# Patient Record
Sex: MALE | Race: OTHER | HISPANIC OR LATINO | URBAN - METROPOLITAN AREA
[De-identification: names, ages, dates, MRNs, and addresses within clinical notes are randomized per-mention and may not be internally consistent; named-entity substitution may affect disease eponyms.]

---

## 2022-12-01 ENCOUNTER — EMERGENCY (EMERGENCY)
Facility: HOSPITAL | Age: 57
LOS: 1 days | Discharge: ANOTHER HOSPITAL | End: 2022-12-01
Attending: STUDENT IN AN ORGANIZED HEALTH CARE EDUCATION/TRAINING PROGRAM
Payer: MEDICAID

## 2022-12-01 VITALS
WEIGHT: 279.99 LBS | DIASTOLIC BLOOD PRESSURE: 120 MMHG | SYSTOLIC BLOOD PRESSURE: 240 MMHG | HEIGHT: 74 IN | OXYGEN SATURATION: 99 % | TEMPERATURE: 98 F | HEART RATE: 70 BPM | RESPIRATION RATE: 20 BRPM

## 2022-12-01 VITALS
HEART RATE: 72 BPM | RESPIRATION RATE: 18 BRPM | OXYGEN SATURATION: 96 % | SYSTOLIC BLOOD PRESSURE: 212 MMHG | DIASTOLIC BLOOD PRESSURE: 76 MMHG | TEMPERATURE: 97 F

## 2022-12-01 LAB
ALBUMIN SERPL ELPH-MCNC: 3.6 G/DL — SIGNIFICANT CHANGE UP (ref 3.5–5)
ALP SERPL-CCNC: 71 U/L — SIGNIFICANT CHANGE UP (ref 40–120)
ALT FLD-CCNC: 20 U/L DA — SIGNIFICANT CHANGE UP (ref 10–60)
ANION GAP SERPL CALC-SCNC: 7 MMOL/L — SIGNIFICANT CHANGE UP (ref 5–17)
APTT BLD: 31.6 SEC — SIGNIFICANT CHANGE UP (ref 27.5–35.5)
AST SERPL-CCNC: 13 U/L — SIGNIFICANT CHANGE UP (ref 10–40)
BASOPHILS # BLD AUTO: 0.06 K/UL — SIGNIFICANT CHANGE UP (ref 0–0.2)
BASOPHILS NFR BLD AUTO: 0.8 % — SIGNIFICANT CHANGE UP (ref 0–2)
BILIRUB SERPL-MCNC: 0.2 MG/DL — SIGNIFICANT CHANGE UP (ref 0.2–1.2)
BUN SERPL-MCNC: 18 MG/DL — SIGNIFICANT CHANGE UP (ref 7–18)
CALCIUM SERPL-MCNC: 8.9 MG/DL — SIGNIFICANT CHANGE UP (ref 8.4–10.5)
CHLORIDE SERPL-SCNC: 102 MMOL/L — SIGNIFICANT CHANGE UP (ref 96–108)
CO2 SERPL-SCNC: 31 MMOL/L — SIGNIFICANT CHANGE UP (ref 22–31)
CREAT SERPL-MCNC: 1.13 MG/DL — SIGNIFICANT CHANGE UP (ref 0.5–1.3)
EGFR: 76 ML/MIN/1.73M2 — SIGNIFICANT CHANGE UP
EOSINOPHIL # BLD AUTO: 0.19 K/UL — SIGNIFICANT CHANGE UP (ref 0–0.5)
EOSINOPHIL NFR BLD AUTO: 2.7 % — SIGNIFICANT CHANGE UP (ref 0–6)
GLUCOSE SERPL-MCNC: 126 MG/DL — HIGH (ref 70–99)
HCT VFR BLD CALC: 43 % — SIGNIFICANT CHANGE UP (ref 39–50)
HGB BLD-MCNC: 13.1 G/DL — SIGNIFICANT CHANGE UP (ref 13–17)
IMM GRANULOCYTES NFR BLD AUTO: 0.3 % — SIGNIFICANT CHANGE UP (ref 0–0.9)
INR BLD: 1.06 RATIO — SIGNIFICANT CHANGE UP (ref 0.88–1.16)
LYMPHOCYTES # BLD AUTO: 2.12 K/UL — SIGNIFICANT CHANGE UP (ref 1–3.3)
LYMPHOCYTES # BLD AUTO: 29.9 % — SIGNIFICANT CHANGE UP (ref 13–44)
MCHC RBC-ENTMCNC: 25 PG — LOW (ref 27–34)
MCHC RBC-ENTMCNC: 30.5 GM/DL — LOW (ref 32–36)
MCV RBC AUTO: 82.1 FL — SIGNIFICANT CHANGE UP (ref 80–100)
MONOCYTES # BLD AUTO: 0.84 K/UL — SIGNIFICANT CHANGE UP (ref 0–0.9)
MONOCYTES NFR BLD AUTO: 11.9 % — SIGNIFICANT CHANGE UP (ref 2–14)
NEUTROPHILS # BLD AUTO: 3.85 K/UL — SIGNIFICANT CHANGE UP (ref 1.8–7.4)
NEUTROPHILS NFR BLD AUTO: 54.4 % — SIGNIFICANT CHANGE UP (ref 43–77)
NRBC # BLD: 0 /100 WBCS — SIGNIFICANT CHANGE UP (ref 0–0)
NT-PROBNP SERPL-SCNC: 41 PG/ML — SIGNIFICANT CHANGE UP (ref 0–125)
PLATELET # BLD AUTO: 200 K/UL — SIGNIFICANT CHANGE UP (ref 150–400)
POTASSIUM SERPL-MCNC: 3.5 MMOL/L — SIGNIFICANT CHANGE UP (ref 3.5–5.3)
POTASSIUM SERPL-SCNC: 3.5 MMOL/L — SIGNIFICANT CHANGE UP (ref 3.5–5.3)
PROT SERPL-MCNC: 7.4 G/DL — SIGNIFICANT CHANGE UP (ref 6–8.3)
PROTHROM AB SERPL-ACNC: 12.6 SEC — SIGNIFICANT CHANGE UP (ref 10.5–13.4)
RBC # BLD: 5.24 M/UL — SIGNIFICANT CHANGE UP (ref 4.2–5.8)
RBC # FLD: 15.3 % — HIGH (ref 10.3–14.5)
SARS-COV-2 RNA SPEC QL NAA+PROBE: SIGNIFICANT CHANGE UP
SODIUM SERPL-SCNC: 140 MMOL/L — SIGNIFICANT CHANGE UP (ref 135–145)
TROPONIN I, HIGH SENSITIVITY RESULT: 11.5 NG/L — SIGNIFICANT CHANGE UP
WBC # BLD: 7.08 K/UL — SIGNIFICANT CHANGE UP (ref 3.8–10.5)
WBC # FLD AUTO: 7.08 K/UL — SIGNIFICANT CHANGE UP (ref 3.8–10.5)

## 2022-12-01 PROCEDURE — 83880 ASSAY OF NATRIURETIC PEPTIDE: CPT

## 2022-12-01 PROCEDURE — 74174 CTA ABD&PLVS W/CONTRAST: CPT | Mod: MA

## 2022-12-01 PROCEDURE — 93010 ELECTROCARDIOGRAM REPORT: CPT

## 2022-12-01 PROCEDURE — 96375 TX/PRO/DX INJ NEW DRUG ADDON: CPT | Mod: XU

## 2022-12-01 PROCEDURE — 71275 CT ANGIOGRAPHY CHEST: CPT | Mod: 26,MA

## 2022-12-01 PROCEDURE — 87635 SARS-COV-2 COVID-19 AMP PRB: CPT

## 2022-12-01 PROCEDURE — 84484 ASSAY OF TROPONIN QUANT: CPT

## 2022-12-01 PROCEDURE — 85730 THROMBOPLASTIN TIME PARTIAL: CPT

## 2022-12-01 PROCEDURE — 85025 COMPLETE CBC W/AUTO DIFF WBC: CPT

## 2022-12-01 PROCEDURE — 80053 COMPREHEN METABOLIC PANEL: CPT

## 2022-12-01 PROCEDURE — 93005 ELECTROCARDIOGRAM TRACING: CPT

## 2022-12-01 PROCEDURE — 96376 TX/PRO/DX INJ SAME DRUG ADON: CPT | Mod: XU

## 2022-12-01 PROCEDURE — 74174 CTA ABD&PLVS W/CONTRAST: CPT | Mod: 26,MA

## 2022-12-01 PROCEDURE — 85610 PROTHROMBIN TIME: CPT

## 2022-12-01 PROCEDURE — 99291 CRITICAL CARE FIRST HOUR: CPT | Mod: 25

## 2022-12-01 PROCEDURE — 71275 CT ANGIOGRAPHY CHEST: CPT | Mod: MA

## 2022-12-01 PROCEDURE — 96374 THER/PROPH/DIAG INJ IV PUSH: CPT | Mod: XU

## 2022-12-01 PROCEDURE — 99291 CRITICAL CARE FIRST HOUR: CPT

## 2022-12-01 PROCEDURE — 36415 COLL VENOUS BLD VENIPUNCTURE: CPT

## 2022-12-01 RX ORDER — NICARDIPINE HYDROCHLORIDE 30 MG/1
5 CAPSULE, EXTENDED RELEASE ORAL
Qty: 40 | Refills: 0 | Status: DISCONTINUED | OUTPATIENT
Start: 2022-12-01 | End: 2022-12-05

## 2022-12-01 RX ORDER — LABETALOL HCL 100 MG
20 TABLET ORAL ONCE
Refills: 0 | Status: COMPLETED | OUTPATIENT
Start: 2022-12-01 | End: 2022-12-01

## 2022-12-01 RX ORDER — FENTANYL CITRATE 50 UG/ML
100 INJECTION INTRAVENOUS ONCE
Refills: 0 | Status: DISCONTINUED | OUTPATIENT
Start: 2022-12-01 | End: 2022-12-01

## 2022-12-01 RX ORDER — MORPHINE SULFATE 50 MG/1
4 CAPSULE, EXTENDED RELEASE ORAL ONCE
Refills: 0 | Status: DISCONTINUED | OUTPATIENT
Start: 2022-12-01 | End: 2022-12-01

## 2022-12-01 RX ORDER — ESMOLOL HCL 100MG/10ML
50 VIAL (ML) INTRAVENOUS
Qty: 2500 | Refills: 0 | Status: DISCONTINUED | OUTPATIENT
Start: 2022-12-01 | End: 2022-12-05

## 2022-12-01 RX ADMIN — NICARDIPINE HYDROCHLORIDE 25 MG/HR: 30 CAPSULE, EXTENDED RELEASE ORAL at 23:34

## 2022-12-01 RX ADMIN — MORPHINE SULFATE 4 MILLIGRAM(S): 50 CAPSULE, EXTENDED RELEASE ORAL at 22:14

## 2022-12-01 RX ADMIN — MORPHINE SULFATE 4 MILLIGRAM(S): 50 CAPSULE, EXTENDED RELEASE ORAL at 20:18

## 2022-12-01 RX ADMIN — Medication 20 MILLIGRAM(S): at 23:30

## 2022-12-01 RX ADMIN — Medication 38.1 MICROGRAM(S)/KG/MIN: at 22:33

## 2022-12-01 RX ADMIN — FENTANYL CITRATE 100 MICROGRAM(S): 50 INJECTION INTRAVENOUS at 22:33

## 2022-12-01 NOTE — ED PROVIDER NOTE - CLINICAL SUMMARY MEDICAL DECISION MAKING FREE TEXT BOX
Kristine: 57-year-old male with past medical history hypertension, "arrhythmia" presents with bilateral chest pain radiating to the back over the past 2 days.  Patient reports severe bilateral back pain with radiation to the chest, reports occasional radiation to left lower extremity over the past 2 days.  Patient reports history of hypertension, states noncompliant with medications.  Denies any fevers, abdominal pain, vomiting, diarrhea, bloody stools, black tarry stools, or rash.  No recent injury or trauma.  History, exam, and vital signs concerning for aortic dissection. Neuro exam nonfocal, distal pulses intact. Called CT to expedite CTA, signed off waiving labs. Plan includes labs, imaging, supportive treatment with dispo pending workup.

## 2022-12-01 NOTE — ED PROVIDER NOTE - PHYSICAL EXAMINATION
CONSTITUTIONAL: non-toxic, uncomfortable appearing  SKIN: no rash, no petechiae.  EYES: PERRL, EOMI, pink conjunctiva, anicteric  ENT: tongue and uvular midline, no exudates, moist mucous membranes  NECK: Supple; no meningismus, no JVD  CARD: RRR, no murmurs, equal radial pulses bilaterally 2+  RESP: CTAB, no respiratory distress  ABD: Soft, non-tender, non-distended, no peritoneal signs, no CVA tenderness  EXT: Normal ROM x4. No edema. DP 2+  NEURO: Alert, oriented. Neuro exam nonfocal.  PSYCH: Cooperative, appropriate.

## 2022-12-01 NOTE — ED ADULT NURSE NOTE - OBJECTIVE STATEMENT
Pt presents to the ER from home for SOB with back Pt presents to the ER from home for SOB/CP with back pain and elevated BP. Pt diaphoretic with elevated BP upon arrival. Pt does not take home rx for HTN. -fever/chills/n/v    GCS 15, airway intact, pt ambulatory

## 2022-12-01 NOTE — ED PROVIDER NOTE - CRITICAL CARE ATTENDING CONTRIBUTION TO CARE
I have personally provided the amount of critical care time documented below, excluding time spent on separate procedures.     The patient's condition is critical. Management options were put in place to ensure further deterioration does not occur. In addition to the usual care provided, I have spent additional time with this patient through but not limited to the following: additional documentation  reviewing test results,  discussing with consultants,  discussing with patient / patient's family prognosis and course of care,  reassessment of patient's status and response to interventions.

## 2022-12-01 NOTE — ED PROVIDER NOTE - PROGRESS NOTE DETAILS
Kristine: received call from radiology, CTA showing type B dissection. Additional pain medication and antihypertensive medications ordered. Spoke to [facility] via transfer center to initiate transfer.

## 2022-12-01 NOTE — ED PROVIDER NOTE - OBJECTIVE STATEMENT
57-year-old male with past medical history hypertension, "arrhythmia" presents with bilateral chest pain radiating to the back over the past 2 days.  Patient reports severe bilateral back pain with radiation to the chest, reports occasional radiation to left lower extremity over the past 2 days.  Patient reports history of hypertension, states noncompliant with medications.  Denies any fevers, abdominal pain, vomiting, diarrhea, bloody stools, black tarry stools, or rash.  No recent injury or trauma.  Denies any additional complaints.

## 2022-12-02 ENCOUNTER — INPATIENT (INPATIENT)
Facility: HOSPITAL | Age: 57
LOS: 4 days | Discharge: HOME | DRG: 300 | End: 2022-12-07
Attending: THORACIC SURGERY (CARDIOTHORACIC VASCULAR SURGERY) | Admitting: THORACIC SURGERY (CARDIOTHORACIC VASCULAR SURGERY)
Payer: MEDICAID

## 2022-12-02 VITALS
DIASTOLIC BLOOD PRESSURE: 77 MMHG | OXYGEN SATURATION: 100 % | TEMPERATURE: 97 F | SYSTOLIC BLOOD PRESSURE: 140 MMHG | HEART RATE: 81 BPM | RESPIRATION RATE: 21 BRPM | WEIGHT: 293.88 LBS

## 2022-12-02 DIAGNOSIS — I71.00 DISSECTION OF UNSPECIFIED SITE OF AORTA: ICD-10-CM

## 2022-12-02 DIAGNOSIS — I71.03 DISSECTION OF THORACOABDOMINAL AORTA: ICD-10-CM

## 2022-12-02 LAB
ALBUMIN SERPL ELPH-MCNC: 4.2 G/DL — SIGNIFICANT CHANGE UP (ref 3.3–5)
ALP SERPL-CCNC: 68 U/L — SIGNIFICANT CHANGE UP (ref 40–120)
ALT FLD-CCNC: 10 U/L — SIGNIFICANT CHANGE UP (ref 10–45)
AMYLASE P1 CFR SERPL: 41 U/L — SIGNIFICANT CHANGE UP (ref 25–125)
ANION GAP SERPL CALC-SCNC: 12 MMOL/L — SIGNIFICANT CHANGE UP (ref 5–17)
APTT BLD: 30.1 SEC — SIGNIFICANT CHANGE UP (ref 27.5–35.5)
AST SERPL-CCNC: 11 U/L — SIGNIFICANT CHANGE UP (ref 10–40)
BASOPHILS # BLD AUTO: 0.04 K/UL — SIGNIFICANT CHANGE UP (ref 0–0.2)
BASOPHILS NFR BLD AUTO: 0.4 % — SIGNIFICANT CHANGE UP (ref 0–2)
BILIRUB SERPL-MCNC: 0.3 MG/DL — SIGNIFICANT CHANGE UP (ref 0.2–1.2)
BLD GP AB SCN SERPL QL: NEGATIVE — SIGNIFICANT CHANGE UP
BUN SERPL-MCNC: 16 MG/DL — SIGNIFICANT CHANGE UP (ref 7–23)
CALCIUM SERPL-MCNC: 8.7 MG/DL — SIGNIFICANT CHANGE UP (ref 8.4–10.5)
CHLORIDE SERPL-SCNC: 100 MMOL/L — SIGNIFICANT CHANGE UP (ref 96–108)
CK MB CFR SERPL CALC: 1.4 NG/ML — SIGNIFICANT CHANGE UP (ref 0–6.7)
CK SERPL-CCNC: 78 U/L — SIGNIFICANT CHANGE UP (ref 30–200)
CO2 SERPL-SCNC: 25 MMOL/L — SIGNIFICANT CHANGE UP (ref 22–31)
CREAT SERPL-MCNC: 0.85 MG/DL — SIGNIFICANT CHANGE UP (ref 0.5–1.3)
EGFR: 102 ML/MIN/1.73M2 — SIGNIFICANT CHANGE UP
EOSINOPHIL # BLD AUTO: 0 K/UL — SIGNIFICANT CHANGE UP (ref 0–0.5)
EOSINOPHIL NFR BLD AUTO: 0 % — SIGNIFICANT CHANGE UP (ref 0–6)
FIBRINOGEN PPP-MCNC: 311 MG/DL — SIGNIFICANT CHANGE UP (ref 200–445)
GAS PNL BLDA: SIGNIFICANT CHANGE UP
GLUCOSE SERPL-MCNC: 178 MG/DL — HIGH (ref 70–99)
HCT VFR BLD CALC: 38.6 % — LOW (ref 39–50)
HGB BLD-MCNC: 12 G/DL — LOW (ref 13–17)
IMM GRANULOCYTES NFR BLD AUTO: 0.4 % — SIGNIFICANT CHANGE UP (ref 0–0.9)
INR BLD: 1.09 RATIO — SIGNIFICANT CHANGE UP (ref 0.88–1.16)
LIDOCAIN IGE QN: 18 U/L — SIGNIFICANT CHANGE UP (ref 7–60)
LYMPHOCYTES # BLD AUTO: 0.52 K/UL — LOW (ref 1–3.3)
LYMPHOCYTES # BLD AUTO: 5.7 % — LOW (ref 13–44)
MAGNESIUM SERPL-MCNC: 1.9 MG/DL — SIGNIFICANT CHANGE UP (ref 1.6–2.6)
MCHC RBC-ENTMCNC: 25 PG — LOW (ref 27–34)
MCHC RBC-ENTMCNC: 31.1 GM/DL — LOW (ref 32–36)
MCV RBC AUTO: 80.4 FL — SIGNIFICANT CHANGE UP (ref 80–100)
MONOCYTES # BLD AUTO: 0.37 K/UL — SIGNIFICANT CHANGE UP (ref 0–0.9)
MONOCYTES NFR BLD AUTO: 4.1 % — SIGNIFICANT CHANGE UP (ref 2–14)
NEUTROPHILS # BLD AUTO: 8.13 K/UL — HIGH (ref 1.8–7.4)
NEUTROPHILS NFR BLD AUTO: 89.4 % — HIGH (ref 43–77)
NRBC # BLD: 0 /100 WBCS — SIGNIFICANT CHANGE UP (ref 0–0)
NT-PROBNP SERPL-SCNC: 39 PG/ML — SIGNIFICANT CHANGE UP (ref 0–300)
PHOSPHATE SERPL-MCNC: 3.6 MG/DL — SIGNIFICANT CHANGE UP (ref 2.5–4.5)
PLATELET # BLD AUTO: 182 K/UL — SIGNIFICANT CHANGE UP (ref 150–400)
POTASSIUM SERPL-MCNC: 3.9 MMOL/L — SIGNIFICANT CHANGE UP (ref 3.5–5.3)
POTASSIUM SERPL-SCNC: 3.9 MMOL/L — SIGNIFICANT CHANGE UP (ref 3.5–5.3)
PROT SERPL-MCNC: 7.1 G/DL — SIGNIFICANT CHANGE UP (ref 6–8.3)
PROTHROM AB SERPL-ACNC: 12.6 SEC — SIGNIFICANT CHANGE UP (ref 10.5–13.4)
RBC # BLD: 4.8 M/UL — SIGNIFICANT CHANGE UP (ref 4.2–5.8)
RBC # FLD: 15.2 % — HIGH (ref 10.3–14.5)
RH IG SCN BLD-IMP: POSITIVE — SIGNIFICANT CHANGE UP
RH IG SCN BLD-IMP: POSITIVE — SIGNIFICANT CHANGE UP
SODIUM SERPL-SCNC: 137 MMOL/L — SIGNIFICANT CHANGE UP (ref 135–145)
TROPONIN T, HIGH SENSITIVITY RESULT: <6 NG/L — SIGNIFICANT CHANGE UP (ref 0–51)
WBC # BLD: 9.1 K/UL — SIGNIFICANT CHANGE UP (ref 3.8–10.5)
WBC # FLD AUTO: 9.1 K/UL — SIGNIFICANT CHANGE UP (ref 3.8–10.5)

## 2022-12-02 PROCEDURE — 93010 ELECTROCARDIOGRAM REPORT: CPT

## 2022-12-02 PROCEDURE — 99291 CRITICAL CARE FIRST HOUR: CPT | Mod: 25

## 2022-12-02 PROCEDURE — 99292 CRITICAL CARE ADDL 30 MIN: CPT | Mod: 25

## 2022-12-02 PROCEDURE — 36620 INSERTION CATHETER ARTERY: CPT

## 2022-12-02 PROCEDURE — 93306 TTE W/DOPPLER COMPLETE: CPT | Mod: 26

## 2022-12-02 PROCEDURE — 99221 1ST HOSP IP/OBS SF/LOW 40: CPT | Mod: 25

## 2022-12-02 RX ORDER — CHLORHEXIDINE GLUCONATE 213 G/1000ML
1 SOLUTION TOPICAL
Refills: 0 | Status: DISCONTINUED | OUTPATIENT
Start: 2022-12-02 | End: 2022-12-04

## 2022-12-02 RX ORDER — LABETALOL HCL 100 MG
2 TABLET ORAL
Qty: 200 | Refills: 0 | Status: DISCONTINUED | OUTPATIENT
Start: 2022-12-02 | End: 2022-12-02

## 2022-12-02 RX ORDER — NICARDIPINE HYDROCHLORIDE 30 MG/1
5 CAPSULE, EXTENDED RELEASE ORAL
Qty: 40 | Refills: 0 | Status: DISCONTINUED | OUTPATIENT
Start: 2022-12-02 | End: 2022-12-04

## 2022-12-02 RX ORDER — LABETALOL HCL 100 MG
200 TABLET ORAL EVERY 8 HOURS
Refills: 0 | Status: DISCONTINUED | OUTPATIENT
Start: 2022-12-02 | End: 2022-12-02

## 2022-12-02 RX ORDER — PANTOPRAZOLE SODIUM 20 MG/1
40 TABLET, DELAYED RELEASE ORAL
Refills: 0 | Status: DISCONTINUED | OUTPATIENT
Start: 2022-12-02 | End: 2022-12-04

## 2022-12-02 RX ORDER — ENOXAPARIN SODIUM 100 MG/ML
40 INJECTION SUBCUTANEOUS EVERY 24 HOURS
Refills: 0 | Status: DISCONTINUED | OUTPATIENT
Start: 2022-12-02 | End: 2022-12-07

## 2022-12-02 RX ORDER — LABETALOL HCL 100 MG
400 TABLET ORAL EVERY 8 HOURS
Refills: 0 | Status: DISCONTINUED | OUTPATIENT
Start: 2022-12-02 | End: 2022-12-03

## 2022-12-02 RX ORDER — HYDROMORPHONE HYDROCHLORIDE 2 MG/ML
1 INJECTION INTRAMUSCULAR; INTRAVENOUS; SUBCUTANEOUS ONCE
Refills: 0 | Status: DISCONTINUED | OUTPATIENT
Start: 2022-12-02 | End: 2022-12-02

## 2022-12-02 RX ORDER — ACETAMINOPHEN 500 MG
650 TABLET ORAL EVERY 6 HOURS
Refills: 0 | Status: DISCONTINUED | OUTPATIENT
Start: 2022-12-02 | End: 2022-12-07

## 2022-12-02 RX ORDER — LABETALOL HCL 100 MG
2 TABLET ORAL
Qty: 1000 | Refills: 0 | Status: DISCONTINUED | OUTPATIENT
Start: 2022-12-02 | End: 2022-12-04

## 2022-12-02 RX ORDER — HYDROMORPHONE HYDROCHLORIDE 2 MG/ML
0.5 INJECTION INTRAMUSCULAR; INTRAVENOUS; SUBCUTANEOUS EVERY 6 HOURS
Refills: 0 | Status: DISCONTINUED | OUTPATIENT
Start: 2022-12-02 | End: 2022-12-04

## 2022-12-02 RX ADMIN — Medication 650 MILLIGRAM(S): at 18:44

## 2022-12-02 RX ADMIN — Medication 30 MG/MIN: at 19:42

## 2022-12-02 RX ADMIN — HYDROMORPHONE HYDROCHLORIDE 0.5 MILLIGRAM(S): 2 INJECTION INTRAMUSCULAR; INTRAVENOUS; SUBCUTANEOUS at 09:30

## 2022-12-02 RX ADMIN — NICARDIPINE HYDROCHLORIDE 25 MG/HR: 30 CAPSULE, EXTENDED RELEASE ORAL at 06:52

## 2022-12-02 RX ADMIN — Medication 30 MG/MIN: at 18:56

## 2022-12-02 RX ADMIN — Medication 650 MILLIGRAM(S): at 19:00

## 2022-12-02 RX ADMIN — NICARDIPINE HYDROCHLORIDE 25 MG/HR: 30 CAPSULE, EXTENDED RELEASE ORAL at 18:57

## 2022-12-02 RX ADMIN — Medication 120 MG/MIN: at 02:27

## 2022-12-02 RX ADMIN — PANTOPRAZOLE SODIUM 40 MILLIGRAM(S): 20 TABLET, DELAYED RELEASE ORAL at 06:52

## 2022-12-02 RX ADMIN — NICARDIPINE HYDROCHLORIDE 25 MG/HR: 30 CAPSULE, EXTENDED RELEASE ORAL at 00:25

## 2022-12-02 RX ADMIN — PANTOPRAZOLE SODIUM 40 MILLIGRAM(S): 20 TABLET, DELAYED RELEASE ORAL at 18:18

## 2022-12-02 RX ADMIN — CHLORHEXIDINE GLUCONATE 1 APPLICATION(S): 213 SOLUTION TOPICAL at 06:46

## 2022-12-02 RX ADMIN — HYDROMORPHONE HYDROCHLORIDE 0.5 MILLIGRAM(S): 2 INJECTION INTRAMUSCULAR; INTRAVENOUS; SUBCUTANEOUS at 09:00

## 2022-12-02 RX ADMIN — Medication 400 MILLIGRAM(S): at 19:00

## 2022-12-02 RX ADMIN — Medication 120 MG/MIN: at 00:25

## 2022-12-02 RX ADMIN — ENOXAPARIN SODIUM 40 MILLIGRAM(S): 100 INJECTION SUBCUTANEOUS at 06:52

## 2022-12-02 RX ADMIN — Medication 120 MG/MIN: at 04:14

## 2022-12-02 RX ADMIN — NICARDIPINE HYDROCHLORIDE 25 MG/HR: 30 CAPSULE, EXTENDED RELEASE ORAL at 19:42

## 2022-12-02 RX ADMIN — Medication 200 MILLIGRAM(S): at 11:26

## 2022-12-02 RX ADMIN — Medication 30 MG/MIN: at 05:56

## 2022-12-02 RX ADMIN — NICARDIPINE HYDROCHLORIDE 25 MG/HR: 30 CAPSULE, EXTENDED RELEASE ORAL at 02:27

## 2022-12-02 RX ADMIN — HYDROMORPHONE HYDROCHLORIDE 1 MILLIGRAM(S): 2 INJECTION INTRAMUSCULAR; INTRAVENOUS; SUBCUTANEOUS at 00:26

## 2022-12-02 RX ADMIN — HYDROMORPHONE HYDROCHLORIDE 1 MILLIGRAM(S): 2 INJECTION INTRAMUSCULAR; INTRAVENOUS; SUBCUTANEOUS at 00:41

## 2022-12-02 NOTE — H&P ADULT - PROBLEM SELECTOR PLAN 1
Acute TYPE b dissection   aggressive BP control with cardene , labetaolol   Pain management   Vascular consult -/+ need for TEVAR stent    GI / DVT prophylaxis.   keep K>4, mag >2.0   NPO   serial Physical exam / to r/o end organ malperfusion .

## 2022-12-02 NOTE — H&P ADULT - HISTORY OF PRESENT ILLNESS
57-year-old male with past medical history hypertension, "arrhythmia" presents with bilateral chest pain radiating to the back over the past 2 days.  Patient reports severe bilateral back pain with radiation to the chest, reports occasional radiation to left lower extremity over the past 2 days.  Patient reports history of hypertension, states noncompliant with medications.  Denies any fevers, abdominal pain, vomiting, diarrhea, bloody stools, black tarry stools, or rash.  No recent injury or trauma.  Denies any additional complaints.    CT scan completed and found to have a Type B aortic dissection . Pt with hypertension emergency.   started on Cardene gtt and esmolol gtt.   after ct scan , pt was transferred to [place] for further medical and surgical management.

## 2022-12-02 NOTE — PATIENT PROFILE ADULT - FALL HARM RISK - HARM RISK INTERVENTIONS
Assistance OOB with selected safe patient handling equipment/Communicate Risk of Fall with Harm to all staff/Reinforce activity limits and safety measures with patient and family/Review medications for side effects contributing to fall risk/Sit up slowly, dangle for a short time, stand at bedside before walking/Tailored Fall Risk Interventions/Toileting schedule using arm’s reach rule for commode and bathroom/Visual Cue: Yellow wristband and red socks/Bed in lowest position, wheels locked, appropriate side rails in place/Call bell, personal items and telephone in reach/Instruct patient to call for assistance before getting out of bed or chair/Non-slip footwear when patient is out of bed/[place] to call system/Physically safe environment - no spills, clutter or unnecessary equipment/Purposeful Proactive Rounding/Room/bathroom lighting operational, light cord in reach

## 2022-12-02 NOTE — PROGRESS NOTE ADULT - SUBJECTIVE AND OBJECTIVE BOX
HPI:   57-year-old male with past medical history hypertension, "arrhythmia" presents with bilateral chest pain radiating to the back over the past 2 days.  Patient reports severe bilateral back pain with radiation to the chest, reports occasional radiation to left lower extremity over the past 2 days.  Patient reports history of hypertension, states noncompliant with medications.  Denies any fevers, abdominal pain, vomiting, diarrhea, bloody stools, black tarry stools, or rash.  No recent injury or trauma.  Denies any additional complaints.    CT scan completed and found to have a Type B aortic dissection . Pt with hypertension emergency.   started on Cardene gtt and esmolol gtt.   after ct scan , pt was transferred to [place] for further medical and surgical management.  (02 Dec 2022 00:28)      Patient seen and examined at the bedside.    Remained critically ill on continuous ICU monitoring.    OBJECTIVE:  Vital Signs Last 24 Hrs  T(C): 36.4 (02 Dec 2022 04:00), Max: 36.9 (01 Dec 2022 22:59)  T(F): 97.6 (02 Dec 2022 04:00), Max: 98.5 (01 Dec 2022 22:59)  HR: 76 (02 Dec 2022 05:15) (70 - 82)  BP: 140/77 (02 Dec 2022 00:15) (140/77 - 240/120)  BP(mean): 82 (02 Dec 2022 00:15) (82 - 82)  RR: 24 (02 Dec 2022 05:15) (13 - 30)  SpO2: 99% (02 Dec 2022 05:15) (94% - 100%)    Parameters below as of 02 Dec 2022 04:00  Patient On (Oxygen Delivery Method): nasal cannula  O2 Flow (L/min): 2      Physical Exam:   General: NAD   Neurology: nonfocal   Eyes: bilateral pupils equal and reactive   ENT/Neck: Neck supple, trachea midline, No JVD   Respiratory: Clear bilaterally   CV: S1S2, no murmurs        [x] Sternal dressing,        [x] Sinus rhythm,   Abdominal: Soft, NT, ND +BS  Extremities: 1-2+ pedal edema noted, + peripheral pulses   Skin: No Rashes, Hematoma, Ecchymosis                           Assessment:  57-year-old male with past medical history hypertension, "arrhythmia" presents with bilateral chest pain radiating to the back over the past 2 days.  Patient reports severe bilateral back pain with radiation to the chest, reports occasional radiation to left lower extremity over the past 2 days.  Patient reports history of hypertension, states noncompliant with medications.  Denies any fevers, abdominal pain, vomiting, diarrhea, bloody stools, black tarry stools, or rash.  No recent injury or trauma.  Denies any additional complaints. CT scan completed and found to have a Type B aortic dissection . Pt with hypertension emergency.  started on Cardene gtt and esmolol gtt. after ct scan , pt was transferred to [place] for further medical and surgical management.       Transfer from [facility] Type B dissection  Hemodynamic instability  Lactic acidosis       Plan:   ***Neuro***  Continue close monitoring of neurological status      ***Cardiovascular***  Transfer from [facility] Type B dissection. Lactate elevated to 2.6, continue trending to monitor for fluid resuscitation as indicated. Invasive hemodynamic monitoring with a central venous catheter & an A-line were required for the continuous central venous and MAP/BP monitoring to ensure adequate cardiovascular support. IV Labetalol and Cardene for blood pressure management.     ***Pulmonary***  Respiratory status required supplemental oxygen with nasal cannula 2 L, close monitoring of breathing pattern and respiratory rate & the following of continuous pulse oximetry for support & to prevent decompensation. Increased concern for atelectasis due to obesity related restrictive lung disease.              ***Heme***  Anemia due to acute blood loss, no current plan for transfusion. Continue to monitor hemoglobin and hematocrit levels. Lovenox continued for venous thromboembolism prophylaxis in addition to sequential compression devices    ***GI***  Patient is currently NPO. Protonix for stress ulcer prophylaxis.     ***Renal***  Optimize renal perfusion with adequate volume resuscitation and continued monitoring of urine output, fluid balance, electrolytes, and BUN/Creatinine.     ***ID***  Afebrile, white blood count within normal limits.   Continue trending white blood count and monitoring fever curve.       ***Endocrine***  No acute issues, monitor glucose for need to initiate sliding scale.          Patient requires continuous monitoring with bedside rhythm monitoring, pulse oximetry monitoring, and continuous central venous and arterial pressure monitoring; and intermittent blood gas analysis. Care plan discussed with the ICU care team.   Patient remained critical, at risk for life threatening decompensation.    I have spent 30 minutes providing critical care management to this patient.    By signing my name below, I, Maria D'Amico, attest that this documentation has been prepared under the direction and in the presence of Sally Oden MD   Electronically signed: Maria D'Amico, Scribe, 12-02-22 @ 06:22    I, Sally Oden, personally performed the services described in this documentation. all medical record entries made by the scribe were at my direction and in my presence. I have reviewed the chart and agree that the record reflects my personal performance and is accurate and complete  Electronically signed: Sally Oden MD  HPI:   57-year-old male with past medical history hypertension, "arrhythmia" presents with bilateral chest pain radiating to the back over the past 2 days.  Patient reports severe bilateral back pain with radiation to the chest, reports occasional radiation to left lower extremity over the past 2 days.  Patient reports history of hypertension, states noncompliant with medications.  Denies any fevers, abdominal pain, vomiting, diarrhea, bloody stools, black tarry stools, or rash.  No recent injury or trauma.  Denies any additional complaints.    CT scan completed and found to have a Type B aortic dissection . Pt with hypertension emergency.   started on Cardene gtt and esmolol gtt.   after ct scan , pt was transferred to [place] for further medical and surgical management.  (02 Dec 2022 00:28)      Patient seen and examined at the bedside.    Remained critically ill on continuous ICU monitoring.    OBJECTIVE:  Vital Signs Last 24 Hrs  T(C): 36.4 (02 Dec 2022 04:00), Max: 36.9 (01 Dec 2022 22:59)  T(F): 97.6 (02 Dec 2022 04:00), Max: 98.5 (01 Dec 2022 22:59)  HR: 76 (02 Dec 2022 05:15) (70 - 82)  BP: 140/77 (02 Dec 2022 00:15) (140/77 - 240/120)  BP(mean): 82 (02 Dec 2022 00:15) (82 - 82)  RR: 24 (02 Dec 2022 05:15) (13 - 30)  SpO2: 99% (02 Dec 2022 05:15) (94% - 100%)    Parameters below as of 02 Dec 2022 04:00  Patient On (Oxygen Delivery Method): nasal cannula  O2 Flow (L/min): 2      Physical Exam:   General: Obese male, in pain during initial exam  Neurology: nonfocal   Eyes: bilateral pupils equal and reactive   ENT/Neck: Neck supple, trachea midline, No JVD   Respiratory: Clear bilaterally   CV: S1S2, no murmurs        [x] Sinus rhythm,   Abdominal: Soft, NT, ND +BS  Extremities: trace pedal edema noted, + peripheral pulses   Skin: No Rashes, Hematoma, Ecchymosis                           Assessment:  57-year-old male with past medical history hypertension, "arrhythmia" presents with bilateral chest pain radiating to the back over the past 2 days.  Patient reports severe bilateral back pain with radiation to the chest, reports occasional radiation to left lower extremity over the past 2 days.  Patient reports history of hypertension, states noncompliant with medications.  Denies any fevers, abdominal pain, vomiting, diarrhea, bloody stools, black tarry stools, or rash.  No recent injury or trauma.  Denies any additional complaints. CT scan completed and found to have a Type B aortic dissection . Pt with hypertension emergency.  started on Cardene gtt and esmolol gtt. after ct scan , pt was transferred to [place] for further medical and surgical management.       Transfer [facility] Type B dissection  Hemodynamic instability  Lactic acidosis       Plan:   ***Neuro***  Continue close monitoring of neurological status, especially with acute lowering of blood pressure.     ***Cardiovascular***  Transfer from [facility] beds at the [facility] Type B dissection. Lactate elevated to 2.6, continue trending to monitor for fluid resuscitation as indicated. Invasive hemodynamic monitoring with an A-line was required for MAP/BP monitoring to ensure adequate cardiovascular support. IV Labetalol and IV Cardene infusions were ordered and titrated for management of malignant hypertension.     ***Pulmonary***  Respiratory status required supplemental oxygen with nasal cannula 2 L, close monitoring of breathing pattern and respiratory rate & the following of continuous pulse oximetry for support & to prevent decompensation. Increased concern for atelectasis due to obesity related restrictive lung disease.              ***Heme***  Continue to monitor hemoglobin and hematocrit levels. Lovenox continued for venous thromboembolism prophylaxis in addition to sequential compression devices    ***GI***  Patient is currently NPO. Protonix for stress ulcer prophylaxis.     ***Renal***  Optimize renal perfusion with adequate volume resuscitation and continued monitoring of urine output, fluid balance, electrolytes, and BUN/Creatinine.     ***ID***  Afebrile, white blood count within normal limits.   Continue trending white blood count and monitoring fever curve.       ***Endocrine***  No acute issues, monitor glucose for need to initiate sliding scale.          Patient requires continuous monitoring with bedside rhythm monitoring, pulse oximetry monitoring, and continuous arterial pressure monitoring; and intermittent blood gas analysis. Care plan discussed with the ICU care team.   Patient remained critical, at risk for life threatening decompensation.    I have spent 40 minutes providing critical care management to this patient.    By signing my name below, I, Maria D'Amico, attest that this documentation has been prepared under the direction and in the presence of Sally Oden MD   Electronically signed: Maria D'Amico, Scribe, 12-02-22 @ 06:22    I, Sally Oden, personally performed the services described in this documentation. all medical record entries made by the scribe were at my direction and in my presence. I have reviewed the chart and agree that the record reflects my personal performance and is accurate and complete  Electronically signed: Sally Oden MD

## 2022-12-02 NOTE — PROGRESS NOTE ADULT - SUBJECTIVE AND OBJECTIVE BOX
Patient seen and examined at the bedside.    Remained critically ill on continuous ICU monitoring.    OBJECTIVE:  Vital Signs Last 24 Hrs  T(C): 36.4 (02 Dec 2022 04:00), Max: 36.9 (01 Dec 2022 22:59)  T(F): 97.6 (02 Dec 2022 04:00), Max: 98.5 (01 Dec 2022 22:59)  HR: 76 (02 Dec 2022 05:15) (70 - 82)  BP: 140/77 (02 Dec 2022 00:15) (140/77 - 240/120)  BP(mean): 82 (02 Dec 2022 00:15) (82 - 82)  RR: 24 (02 Dec 2022 05:15) (13 - 30)  SpO2: 99% (02 Dec 2022 05:15) (94% - 100%)    Parameters below as of 02 Dec 2022 04:00  Patient On (Oxygen Delivery Method): nasal cannula  O2 Flow (L/min): 2        Physical Exam:   General: NAD   Neurology: nonfocal   Eyes: bilateral pupils equal and reactive   ENT/Neck: Neck supple, trachea midline, No JVD   Respiratory: Clear bilaterally   CV: S1S2, no murmurs        [x] Sternal dressing,        [x] Sinus rhythm,   Abdominal: Soft, NT, ND +BS  Extremities: 1-2+ pedal edema noted, + peripheral pulses   Skin: No Rashes, Hematoma, Ecchymosis                            Assessment:  57-year-old male with past medical history hypertension, "arrhythmia" presents with bilateral chest pain radiating to the back over the past 2 days.  Patient reports severe bilateral back pain with radiation to the chest, reports occasional radiation to left lower extremity over the past 2 days.  Patient reports history of hypertension, states noncompliant with medications.  Denies any fevers, abdominal pain, vomiting, diarrhea, bloody stools, black tarry stools, or rash.  No recent injury or trauma.  Denies any additional complaints. CT scan completed and found to have a Type B aortic dissection . Pt with hypertension emergency.  started on Cardene gtt and esmolol gtt. after ct scan , pt was transferred to [place] for further medical and surgical management.       [facility] Type B dissection  Hemodynamic instability  Lactic acidosis    Today:      Plan:   ***Neuro***  [x] Nonfocal  Post operative neuro assessment     ***Cardiovascular***  Invasive hemodynamic monitoring, assess perfusion indices   SR / MAP 65 / Hct 38.6/ Lactate 2.1  [x] Labetalol 2 mg [x] Cardene 1 mg  Continuos reassessment of hemodynamics   [x] VTE ppx with Lovenox  Serial EKG and cardiac enzymes     ***Pulmonary***  [x] NC 2 L  Titration of FiO2, follow SpO2, CXR, blood gasses     ***GI***  [x] Diet: NPO  [x] Protonix      ***Renal***  Continue to monitor I/Os, BUN/Creatinine.   Replete lytes PRN  Diaz present     ***ID***  No active antibiotic coverage      ***Endocrine***                - Continue monitoring blood glucose closely for need to initiate sliding scale              Patient requires continuous monitoring with bedside rhythm monitoring, pulse oximetry monitoring, and continuous central venous and arterial pressure monitoring; and intermittent blood gas analysis. Care plan discussed with the ICU care team.   Patient remained critical, at risk for life threatening decompensation.    I have spent 30 minutes providing critical care management to this patient.    By signing my name below, I, Maria D'Amico, attest that this documentation has been prepared under the direction and in the presence of NASIR Rivera  Electronically signed: Maria D'Amico, Scribe, 12-02-22 @ 07:45    I, Wally Villalba , personally performed the services described in this documentation. all medical record entries made by the scribe were at my direction and in my presence. I have reviewed the chart and agree that the record reflects my personal performance and is accurate and complete  Electronically signed: NASIR Rivera Patient seen and examined at the bedside.    Remained critically ill on continuous ICU monitoring.    OBJECTIVE:  Vital Signs Last 24 Hrs  T(C): 36.4 (02 Dec 2022 04:00), Max: 36.9 (01 Dec 2022 22:59)  T(F): 97.6 (02 Dec 2022 04:00), Max: 98.5 (01 Dec 2022 22:59)  HR: 76 (02 Dec 2022 05:15) (70 - 82)  BP: 140/77 (02 Dec 2022 00:15) (140/77 - 240/120)  BP(mean): 82 (02 Dec 2022 00:15) (82 - 82)  RR: 24 (02 Dec 2022 05:15) (13 - 30)  SpO2: 99% (02 Dec 2022 05:15) (94% - 100%)    Parameters below as of 02 Dec 2022 04:00  Patient On (Oxygen Delivery Method): nasal cannula  O2 Flow (L/min): 2      Physical Exam:   General: NAD   Neurology: nonfocal   Eyes: bilateral pupils equal and reactive   ENT/Neck: Neck supple, trachea midline, No JVD   Respiratory: Clear bilaterally   CV: S1S2, no murmurs        [x] Sternal dressing,        [x] Sinus rhythm,   Abdominal: Soft, NT, ND +BS  Extremities: negative pedal edema noted, + peripheral pulses   Skin: No Rashes, Hematoma, Ecchymosis                            Assessment:  57-year-old male with past medical history hypertension, "arrhythmia" presents with bilateral chest pain radiating to the back over the past 2 days.  Patient reports severe bilateral back pain with radiation to the chest, reports occasional radiation to left lower extremity over the past 2 days.  Patient reports history of hypertension, states noncompliant with medications.  Denies any fevers, abdominal pain, vomiting, diarrhea, bloody stools, black tarry stools, or rash.  No recent injury or trauma.  Denies any additional complaints. CT scan completed and found to have a Type B aortic dissection . Pt with hypertension emergency.  started on Cardene gtt and esmolol gtt. after ct scan , pt was transferred to [place] for further medical and surgical management.       Transfer from [facility] Type B dissection  Hemodynamic instability  Lactic acidosis    Today:  medical management of type B dissection. Added PO labetalol, wean drips accordingly.     Plan:   ***Neuro***  [x] Nonfocal      ***Cardiovascular***  Invasive hemodynamic monitoring, assess perfusion indices   SR / MAP 65 / Hct 38.6/ Lactate 2.1  [x] Labetalol 2 mg [x] Cardene 1 mg  Continuos reassessment of hemodynamics   [x] VTE ppx with Lovenox  Uptitrate PO labetalol as tolerated to wean gtts     ***Pulmonary***  [x] NC 2 L  Titration of FiO2, follow SpO2, CXR, blood gasses     ***GI***  [x] Diet: CC  [x] Protonix      ***Renal***  Continue to monitor I/Os, BUN/Creatinine.   Replete lytes PRN    ***ID***  No active antibiotic coverage needed at this time     ***Endocrine***                - Continue monitoring blood glucose closely for need to initiate sliding scale              Patient requires continuous monitoring with bedside rhythm monitoring, pulse oximetry monitoring, and continuous central venous and arterial pressure monitoring; and intermittent blood gas analysis. Care plan discussed with the ICU care team.   Patient remained critical, at risk for life threatening decompensation.    I have spent 30 minutes providing critical care management to this patient.    By signing my name below, I, Maria D'Amico, attest that this documentation has been prepared under the direction and in the presence of NASIR Rivera  Electronically signed: Maria D'Amico, Scribe, 12-02-22 @ 07:45    I, Wally Villalba , personally performed the services described in this documentation. all medical record entries made by the evonneibjj were at my direction and in my presence. I have reviewed the chart and agree that the record reflects my personal performance and is accurate and complete  Electronically signed: NASIR Rivera

## 2022-12-02 NOTE — H&P ADULT - NSHPLABSRESULTS_GEN_ALL_CORE
chlorhexidine 4% Liquid 1 Application(s) Topical <User Schedule>  enoxaparin Injectable 40 milliGRAM(s) SubCutaneous every 24 hours  HYDROmorphone  Injectable 1 milliGRAM(s) IV Push once  labetalol Infusion 2 mG/Min IV Continuous <Continuous>  niCARdipine Infusion 5 mG/Hr IV Continuous <Continuous>  pantoprazole  Injectable 40 milliGRAM(s) IV Push two times a day                            12.0   9.10  )-----------( 182      ( 02 Dec 2022 00:51 )             38.6       Hemoglobin: 12.0 g/dL (12-02 @ 00:51)  Hemoglobin: 13.1 g/dL (12-01 @ 20:10)      12-02    137  |  100  |  16  ----------------------------<  178<H>  3.9   |  25  |  0.85    Ca    8.7      02 Dec 2022 00:51  Phos  3.6     12-02  Mg     1.9     12-02    TPro  7.1  /  Alb  4.2  /  TBili  0.3  /  DBili  x   /  AST  11  /  ALT  10  /  AlkPhos  68  12-02    Creatinine Trend: 0.85<--, 1.13<--    COAGS: PT/INR - ( 02 Dec 2022 00:51 )   PT: 12.6 sec;   INR: 1.09 ratio         PTT - ( 02 Dec 2022 00:51 )  PTT:30.1 sec    CARDIAC MARKERS ( 02 Dec 2022 00:51 )  x     / x     / 78 U/L / x     / 1.4 ng/mL        T(C): 36 (12-02-22 @ 00:15), Max: 36.9 (12-01-22 @ 22:59)  HR: 72 (12-02-22 @ 01:30) (70 - 82)  BP: 140/77 (12-02-22 @ 00:15) (140/77 - 240/120)  RR: 18 (12-02-22 @ 01:30) (14 - 30)  SpO2: 94% (12-02-22 @ 01:30) (94% - 100%)  Wt(kg): --    I&O's Summary

## 2022-12-02 NOTE — PROCEDURE NOTE - NSPROCDETAILS_GEN_ALL_CORE
location identified, draped/prepped, sterile technique used, needle inserted/introduced/positive blood return obtained via catheter/connected to a pressurized flush line/sutured in place/hemostasis with direct pressure, dressing applied/Seldinger technique

## 2022-12-03 LAB
A1C WITH ESTIMATED AVERAGE GLUCOSE RESULT: 6.2 % — HIGH (ref 4–5.6)
ALBUMIN SERPL ELPH-MCNC: 3.5 G/DL — SIGNIFICANT CHANGE UP (ref 3.3–5)
ALP SERPL-CCNC: 54 U/L — SIGNIFICANT CHANGE UP (ref 40–120)
ALT FLD-CCNC: 7 U/L — LOW (ref 10–45)
ANION GAP SERPL CALC-SCNC: 10 MMOL/L — SIGNIFICANT CHANGE UP (ref 5–17)
ANION GAP SERPL CALC-SCNC: 9 MMOL/L — SIGNIFICANT CHANGE UP (ref 5–17)
APPEARANCE UR: CLEAR — SIGNIFICANT CHANGE UP
AST SERPL-CCNC: 10 U/L — SIGNIFICANT CHANGE UP (ref 10–40)
BACTERIA # UR AUTO: NEGATIVE — SIGNIFICANT CHANGE UP
BILIRUB SERPL-MCNC: 0.4 MG/DL — SIGNIFICANT CHANGE UP (ref 0.2–1.2)
BILIRUB UR-MCNC: NEGATIVE — SIGNIFICANT CHANGE UP
BUN SERPL-MCNC: 35 MG/DL — HIGH (ref 7–23)
BUN SERPL-MCNC: 37 MG/DL — HIGH (ref 7–23)
CALCIUM SERPL-MCNC: 8.3 MG/DL — LOW (ref 8.4–10.5)
CALCIUM SERPL-MCNC: 8.5 MG/DL — SIGNIFICANT CHANGE UP (ref 8.4–10.5)
CHLORIDE SERPL-SCNC: 102 MMOL/L — SIGNIFICANT CHANGE UP (ref 96–108)
CHLORIDE SERPL-SCNC: 99 MMOL/L — SIGNIFICANT CHANGE UP (ref 96–108)
CO2 SERPL-SCNC: 25 MMOL/L — SIGNIFICANT CHANGE UP (ref 22–31)
CO2 SERPL-SCNC: 26 MMOL/L — SIGNIFICANT CHANGE UP (ref 22–31)
COD CRY URNS QL: ABNORMAL
COLOR SPEC: YELLOW — SIGNIFICANT CHANGE UP
CREAT SERPL-MCNC: 1.73 MG/DL — HIGH (ref 0.5–1.3)
CREAT SERPL-MCNC: 1.75 MG/DL — HIGH (ref 0.5–1.3)
DIFF PNL FLD: NEGATIVE — SIGNIFICANT CHANGE UP
EGFR: 45 ML/MIN/1.73M2 — LOW
EGFR: 46 ML/MIN/1.73M2 — LOW
EPI CELLS # UR: 1 — SIGNIFICANT CHANGE UP
ESTIMATED AVERAGE GLUCOSE: 131 MG/DL — HIGH (ref 68–114)
GAS PNL BLDA: SIGNIFICANT CHANGE UP
GLUCOSE BLDC GLUCOMTR-MCNC: 133 MG/DL — HIGH (ref 70–99)
GLUCOSE SERPL-MCNC: 108 MG/DL — HIGH (ref 70–99)
GLUCOSE SERPL-MCNC: 108 MG/DL — HIGH (ref 70–99)
GLUCOSE UR QL: NEGATIVE — SIGNIFICANT CHANGE UP
HCT VFR BLD CALC: 32.3 % — LOW (ref 39–50)
HGB BLD-MCNC: 10.1 G/DL — LOW (ref 13–17)
HYALINE CASTS # UR AUTO: 5 /LPF — SIGNIFICANT CHANGE UP (ref 0–7)
KETONES UR-MCNC: SIGNIFICANT CHANGE UP
LEUKOCYTE ESTERASE UR-ACNC: NEGATIVE — SIGNIFICANT CHANGE UP
MAGNESIUM SERPL-MCNC: 2 MG/DL — SIGNIFICANT CHANGE UP (ref 1.6–2.6)
MCHC RBC-ENTMCNC: 25.6 PG — LOW (ref 27–34)
MCHC RBC-ENTMCNC: 31.3 GM/DL — LOW (ref 32–36)
MCV RBC AUTO: 81.8 FL — SIGNIFICANT CHANGE UP (ref 80–100)
NITRITE UR-MCNC: NEGATIVE — SIGNIFICANT CHANGE UP
NRBC # BLD: 0 /100 WBCS — SIGNIFICANT CHANGE UP (ref 0–0)
PH UR: 6 — SIGNIFICANT CHANGE UP (ref 5–8)
PHOSPHATE SERPL-MCNC: 5.8 MG/DL — HIGH (ref 2.5–4.5)
PLATELET # BLD AUTO: 154 K/UL — SIGNIFICANT CHANGE UP (ref 150–400)
POTASSIUM SERPL-MCNC: 3.5 MMOL/L — SIGNIFICANT CHANGE UP (ref 3.5–5.3)
POTASSIUM SERPL-MCNC: 3.8 MMOL/L — SIGNIFICANT CHANGE UP (ref 3.5–5.3)
POTASSIUM SERPL-SCNC: 3.5 MMOL/L — SIGNIFICANT CHANGE UP (ref 3.5–5.3)
POTASSIUM SERPL-SCNC: 3.8 MMOL/L — SIGNIFICANT CHANGE UP (ref 3.5–5.3)
PROT SERPL-MCNC: 5.9 G/DL — LOW (ref 6–8.3)
PROT UR-MCNC: ABNORMAL
RBC # BLD: 3.95 M/UL — LOW (ref 4.2–5.8)
RBC # FLD: 15.7 % — HIGH (ref 10.3–14.5)
RBC CASTS # UR COMP ASSIST: 6 /HPF — HIGH (ref 0–4)
SODIUM SERPL-SCNC: 135 MMOL/L — SIGNIFICANT CHANGE UP (ref 135–145)
SODIUM SERPL-SCNC: 136 MMOL/L — SIGNIFICANT CHANGE UP (ref 135–145)
SP GR SPEC: 1.03 — HIGH (ref 1.01–1.02)
TSH SERPL-MCNC: 0.36 UIU/ML — SIGNIFICANT CHANGE UP (ref 0.27–4.2)
UROBILINOGEN FLD QL: NEGATIVE — SIGNIFICANT CHANGE UP
WBC # BLD: 8.98 K/UL — SIGNIFICANT CHANGE UP (ref 3.8–10.5)
WBC # FLD AUTO: 8.98 K/UL — SIGNIFICANT CHANGE UP (ref 3.8–10.5)
WBC UR QL: 6 /HPF — HIGH (ref 0–5)

## 2022-12-03 PROCEDURE — 99291 CRITICAL CARE FIRST HOUR: CPT

## 2022-12-03 PROCEDURE — 71045 X-RAY EXAM CHEST 1 VIEW: CPT | Mod: 26

## 2022-12-03 RX ORDER — LABETALOL HCL 100 MG
600 TABLET ORAL EVERY 8 HOURS
Refills: 0 | Status: DISCONTINUED | OUTPATIENT
Start: 2022-12-03 | End: 2022-12-03

## 2022-12-03 RX ORDER — SODIUM CHLORIDE 9 MG/ML
500 INJECTION INTRAMUSCULAR; INTRAVENOUS; SUBCUTANEOUS ONCE
Refills: 0 | Status: COMPLETED | OUTPATIENT
Start: 2022-12-03 | End: 2022-12-03

## 2022-12-03 RX ORDER — LABETALOL HCL 100 MG
600 TABLET ORAL EVERY 6 HOURS
Refills: 0 | Status: DISCONTINUED | OUTPATIENT
Start: 2022-12-03 | End: 2022-12-06

## 2022-12-03 RX ORDER — POTASSIUM CHLORIDE 20 MEQ
40 PACKET (EA) ORAL ONCE
Refills: 0 | Status: COMPLETED | OUTPATIENT
Start: 2022-12-03 | End: 2022-12-03

## 2022-12-03 RX ORDER — NIFEDIPINE 30 MG
30 TABLET, EXTENDED RELEASE 24 HR ORAL DAILY
Refills: 0 | Status: DISCONTINUED | OUTPATIENT
Start: 2022-12-03 | End: 2022-12-04

## 2022-12-03 RX ORDER — LABETALOL HCL 100 MG
800 TABLET ORAL EVERY 8 HOURS
Refills: 0 | Status: DISCONTINUED | OUTPATIENT
Start: 2022-12-03 | End: 2022-12-03

## 2022-12-03 RX ORDER — LABETALOL HCL 100 MG
400 TABLET ORAL EVERY 8 HOURS
Refills: 0 | Status: DISCONTINUED | OUTPATIENT
Start: 2022-12-03 | End: 2022-12-03

## 2022-12-03 RX ADMIN — Medication 30 MG/MIN: at 17:42

## 2022-12-03 RX ADMIN — PANTOPRAZOLE SODIUM 40 MILLIGRAM(S): 20 TABLET, DELAYED RELEASE ORAL at 18:30

## 2022-12-03 RX ADMIN — PANTOPRAZOLE SODIUM 40 MILLIGRAM(S): 20 TABLET, DELAYED RELEASE ORAL at 05:57

## 2022-12-03 RX ADMIN — ENOXAPARIN SODIUM 40 MILLIGRAM(S): 100 INJECTION SUBCUTANEOUS at 05:57

## 2022-12-03 RX ADMIN — NICARDIPINE HYDROCHLORIDE 25 MG/HR: 30 CAPSULE, EXTENDED RELEASE ORAL at 17:42

## 2022-12-03 RX ADMIN — SODIUM CHLORIDE 250 MILLILITER(S): 9 INJECTION INTRAMUSCULAR; INTRAVENOUS; SUBCUTANEOUS at 02:58

## 2022-12-03 RX ADMIN — CHLORHEXIDINE GLUCONATE 1 APPLICATION(S): 213 SOLUTION TOPICAL at 07:17

## 2022-12-03 RX ADMIN — Medication 30 MILLIGRAM(S): at 13:58

## 2022-12-03 RX ADMIN — NICARDIPINE HYDROCHLORIDE 25 MG/HR: 30 CAPSULE, EXTENDED RELEASE ORAL at 00:16

## 2022-12-03 RX ADMIN — Medication 400 MILLIGRAM(S): at 02:20

## 2022-12-03 RX ADMIN — Medication 40 MILLIEQUIVALENT(S): at 01:14

## 2022-12-03 RX ADMIN — Medication 30 MG/MIN: at 00:16

## 2022-12-03 RX ADMIN — Medication 600 MILLIGRAM(S): at 11:48

## 2022-12-03 RX ADMIN — Medication 600 MILLIGRAM(S): at 21:08

## 2022-12-03 RX ADMIN — Medication 30 MG/MIN: at 05:57

## 2022-12-03 NOTE — PROGRESS NOTE ADULT - SUBJECTIVE AND OBJECTIVE BOX
Patient seen and examined at the bedside.    Remained critically ill on continuous ICU monitoring.    OBJECTIVE:  Vital Signs Last 24 Hrs  T(C): 37 (03 Dec 2022 12:00), Max: 37 (03 Dec 2022 12:00)  T(F): 98.6 (03 Dec 2022 12:00), Max: 98.6 (03 Dec 2022 12:00)  HR: 79 (03 Dec 2022 12:00) (65 - 79)  BP: 105/55 (03 Dec 2022 09:30) (93/54 - 121/57)  BP(mean): 78 (03 Dec 2022 09:30) (67 - 81)  RR: 26 (03 Dec 2022 12:00) (11 - 38)  SpO2: 95% (03 Dec 2022 12:00) (91% - 100%)    Parameters below as of 03 Dec 2022 12:00  Patient On (Oxygen Delivery Method): room air        Today:    Review of systems:  Gen: No fever  EYES/ENT: No visual changes;  No vertigo or throat pain   NECK: No pain   RES:  No shortness of breath or Cough  CARD: No chest pain   GI: No abdominal pain  : No dysuria  NEURO: No weakness  SKIN: No itching, rashes      Physical Exam:   General: NAD   Neurology: nonfocal   Eyes: bilateral pupils equal and reactive   ENT/Neck: Neck supple, trachea midline, No JVD   Respiratory: Clear bilaterally   CV: S1S2, no murmurs        [x] Sternal dressing,        [x] Sinus rhythm,   Abdominal: Soft, NT, ND +BS  Extremities: negative pedal edema noted, + peripheral pulses   Skin: No Rashes, Hematoma, Ecchymosis                                Assessment:  57-year-old male with past medical history hypertension, "arrhythmia" presents with bilateral chest pain radiating to the back over the past 2 days.  Patient reports severe bilateral back pain with radiation to the chest, reports occasional radiation to left lower extremity over the past 2 days.  Patient reports history of hypertension, states noncompliant with medications.  Denies any fevers, abdominal pain, vomiting, diarrhea, bloody stools, black tarry stools, or rash.  No recent injury or trauma.  Denies any additional complaints. CT scan completed and found to have a Type B aortic dissection . Pt with hypertension emergency.  started on Cardene gtt and esmolol gtt. after ct scan , pt was transferred to [place] for further medical and surgical management.       Transfer from [facility] Type B dissection  Hemodynamic instability      Plan:   ***Neuro***  [x] Nonfocal    Post operative neuro assessment     ***Cardiovascular***  Invasive hemodynamic monitoring, assess perfusion indices   SR / MAP 65 / Hct 38.6/ Lactate 2.1  [x] Labetalol 2 mg for blood pressure management   PO Labetalol and IV Cardene  for rate control   [x] VTE prophylaxis with Enoxaparin   Continuos reassessment of hemodynamics          ***Pulmonary***  Comfortable on room air,   Continue to monitor SpO2 via pulse oximetry  Encourage bedside spirometry                   ***GI***  [x] Diet: CC  [x] Protonix        ***Renal***  Continue to monitor I/Os, BUN/Creatinine.   Replete lytes PRN      ***ID***  No active antibiotic coverage      ***Endocrine***             - Continue monitoring blood glucose closely for need to initiate sliding scale             Patient requires continuous monitoring with bedside rhythm monitoring, pulse oximetry monitoring, and continuous central venous and arterial pressure monitoring; and intermittent blood gas analysis. Care plan discussed with the ICU care team.   Patient remained critical, at risk for life threatening decompensation.    I have spent 30 minutes providing critical care management to this patient.    By signing my name below, I, Clarita Bacon, attest that this documentation has been prepared under the direction and in the presence of NASIR Duque  Electronically signed: Ciera Gutierrez, 12-03-22 @ 12:27    I, NASIR Duque, personally performed the services described in this documentation. all medical record entries made by the scribe were at my direction and in my presence. I have reviewed the chart and agree that the record reflects my personal performance and is accurate and complete  Electronically signed: NASIR Duque Patient seen and examined at the bedside.    Remained critically ill on continuous ICU monitoring.    OBJECTIVE:  Vital Signs Last 24 Hrs  T(C): 37 (03 Dec 2022 12:00), Max: 37 (03 Dec 2022 12:00)  T(F): 98.6 (03 Dec 2022 12:00), Max: 98.6 (03 Dec 2022 12:00)  HR: 79 (03 Dec 2022 12:00) (65 - 79)  BP: 105/55 (03 Dec 2022 09:30) (93/54 - 121/57)  BP(mean): 78 (03 Dec 2022 09:30) (67 - 81)  RR: 26 (03 Dec 2022 12:00) (11 - 38)  SpO2: 95% (03 Dec 2022 12:00) (91% - 100%)    Parameters below as of 03 Dec 2022 12:00  Patient On (Oxygen Delivery Method): room air        Today:  - Labetalol and Cardene drips for HTN  - PO Labetalol increased to 600 Q8, and Procardia XL 30 QD added to try and wean off  - BUN/Cr increased, continue to monitor, patient is voiding without issue  - Patient is candidate for floor if reliably off Labetalol and Cardene drips    Review of systems:  Gen: No fever  EYES/ENT: No visual changes;  No vertigo or throat pain   NECK: No pain   RES:  No shortness of breath or Cough  CARD: No chest pain   GI: No abdominal pain  : No dysuria  NEURO: No weakness  SKIN: No itching, rashes      Physical Exam:   General: NAD   Neurology: nonfocal   Eyes: bilateral pupils equal and reactive   ENT/Neck: Neck supple, trachea midline, No JVD   Respiratory: Clear bilaterally   CV: S1S2, no murmurs        [x] Sternal dressing,        [x] Sinus rhythm,   Abdominal: Soft, NT, ND +BS  Extremities: negative pedal edema noted, + peripheral pulses   Skin: No Rashes, Hematoma, Ecchymosis                                Assessment:  57-year-old male with past medical history hypertension, "arrhythmia" presents with bilateral chest pain radiating to the back over the past 2 days.  Patient reports severe bilateral back pain with radiation to the chest, reports occasional radiation to left lower extremity over the past 2 days.  Patient reports history of hypertension, states noncompliant with medications.  Denies any fevers, abdominal pain, vomiting, diarrhea, bloody stools, black tarry stools, or rash.  No recent injury or trauma.  Denies any additional complaints. CT scan completed and found to have a Type B aortic dissection . Pt with hypertension emergency.  started on Cardene gtt and esmolol gtt. after ct scan , pt was transferred to [place] for further medical and surgical management.       Transfer from [facility] Type B dissection  Hemodynamic instability      Plan:   ***Neuro***  [x] Nonfocal    Post operative neuro assessment     ***Cardiovascular***  Invasive hemodynamic monitoring, assess perfusion indices   SR / MAP 65 / Hct 38.6/ Lactate 2.1  [x] Labetalol 2 mg drip for blood pressure control, Cardene drip as well  Increase PO Labetalol and Procardia as tolerated to wean off drips for Type B Dissection    [x] VTE prophylaxis with Enoxaparin   Continuos reassessment of hemodynamics          ***Pulmonary***  Comfortable on room air,   Continue to monitor SpO2 via pulse oximetry  Encourage bedside spirometry                   ***GI***  [x] Diet: CC  [x] Protonix        ***Renal***  ALYSA  Continue to monitor I/Os, BUN/Creatinine.   Replete lytes PRN      ***ID***  No active antibiotic coverage      ***Endocrine***             - Continue monitoring blood glucose closely for need to initiate sliding scale             Patient requires continuous monitoring with bedside rhythm monitoring, pulse oximetry monitoring, and continuous central venous and arterial pressure monitoring; and intermittent blood gas analysis. Care plan discussed with the ICU care team.   Patient remained critical, at risk for life threatening decompensation.    I have spent 30 minutes providing critical care management to this patient.    By signing my name below, I, Clarita Bacon, attest that this documentation has been prepared under the direction and in the presence of NASIR Duque  Electronically signed: Ciera Gutierrez, 12-03-22 @ 12:27    I, NASIR Duque, personally performed the services described in this documentation. all medical record entries made by the evonneibjj were at my direction and in my presence. I have reviewed the chart and agree that the record reflects my personal performance and is accurate and complete  Electronically signed: NASIR Duque

## 2022-12-04 LAB
ALBUMIN SERPL ELPH-MCNC: 3.5 G/DL — SIGNIFICANT CHANGE UP (ref 3.3–5)
ALP SERPL-CCNC: 56 U/L — SIGNIFICANT CHANGE UP (ref 40–120)
ALT FLD-CCNC: 8 U/L — LOW (ref 10–45)
ANION GAP SERPL CALC-SCNC: 9 MMOL/L — SIGNIFICANT CHANGE UP (ref 5–17)
AST SERPL-CCNC: 12 U/L — SIGNIFICANT CHANGE UP (ref 10–40)
BILIRUB SERPL-MCNC: 0.3 MG/DL — SIGNIFICANT CHANGE UP (ref 0.2–1.2)
BUN SERPL-MCNC: 32 MG/DL — HIGH (ref 7–23)
CALCIUM SERPL-MCNC: 8.3 MG/DL — LOW (ref 8.4–10.5)
CHLORIDE SERPL-SCNC: 105 MMOL/L — SIGNIFICANT CHANGE UP (ref 96–108)
CO2 SERPL-SCNC: 25 MMOL/L — SIGNIFICANT CHANGE UP (ref 22–31)
CREAT SERPL-MCNC: 1.23 MG/DL — SIGNIFICANT CHANGE UP (ref 0.5–1.3)
EGFR: 69 ML/MIN/1.73M2 — SIGNIFICANT CHANGE UP
GAS PNL BLDA: SIGNIFICANT CHANGE UP
GAS PNL BLDA: SIGNIFICANT CHANGE UP
GLUCOSE SERPL-MCNC: 105 MG/DL — HIGH (ref 70–99)
HCT VFR BLD CALC: 32.7 % — LOW (ref 39–50)
HGB BLD-MCNC: 10.2 G/DL — LOW (ref 13–17)
MAGNESIUM SERPL-MCNC: 2.1 MG/DL — SIGNIFICANT CHANGE UP (ref 1.6–2.6)
MCHC RBC-ENTMCNC: 25.2 PG — LOW (ref 27–34)
MCHC RBC-ENTMCNC: 31.2 GM/DL — LOW (ref 32–36)
MCV RBC AUTO: 80.9 FL — SIGNIFICANT CHANGE UP (ref 80–100)
NRBC # BLD: 0 /100 WBCS — SIGNIFICANT CHANGE UP (ref 0–0)
PHOSPHATE SERPL-MCNC: 3.8 MG/DL — SIGNIFICANT CHANGE UP (ref 2.5–4.5)
PLATELET # BLD AUTO: 159 K/UL — SIGNIFICANT CHANGE UP (ref 150–400)
POTASSIUM SERPL-MCNC: 4 MMOL/L — SIGNIFICANT CHANGE UP (ref 3.5–5.3)
POTASSIUM SERPL-SCNC: 4 MMOL/L — SIGNIFICANT CHANGE UP (ref 3.5–5.3)
PROT SERPL-MCNC: 5.9 G/DL — LOW (ref 6–8.3)
RBC # BLD: 4.04 M/UL — LOW (ref 4.2–5.8)
RBC # FLD: 16.1 % — HIGH (ref 10.3–14.5)
SODIUM SERPL-SCNC: 139 MMOL/L — SIGNIFICANT CHANGE UP (ref 135–145)
WBC # BLD: 7.25 K/UL — SIGNIFICANT CHANGE UP (ref 3.8–10.5)
WBC # FLD AUTO: 7.25 K/UL — SIGNIFICANT CHANGE UP (ref 3.8–10.5)

## 2022-12-04 PROCEDURE — 99232 SBSQ HOSP IP/OBS MODERATE 35: CPT

## 2022-12-04 PROCEDURE — 71045 X-RAY EXAM CHEST 1 VIEW: CPT | Mod: 26

## 2022-12-04 RX ORDER — PANTOPRAZOLE SODIUM 20 MG/1
40 TABLET, DELAYED RELEASE ORAL
Refills: 0 | Status: DISCONTINUED | OUTPATIENT
Start: 2022-12-04 | End: 2022-12-07

## 2022-12-04 RX ORDER — NIFEDIPINE 30 MG
90 TABLET, EXTENDED RELEASE 24 HR ORAL DAILY
Refills: 0 | Status: DISCONTINUED | OUTPATIENT
Start: 2022-12-05 | End: 2022-12-06

## 2022-12-04 RX ORDER — NICARDIPINE HYDROCHLORIDE 30 MG/1
5 CAPSULE, EXTENDED RELEASE ORAL
Qty: 40 | Refills: 0 | Status: DISCONTINUED | OUTPATIENT
Start: 2022-12-04 | End: 2022-12-05

## 2022-12-04 RX ORDER — CHLORHEXIDINE GLUCONATE 213 G/1000ML
1 SOLUTION TOPICAL
Refills: 0 | Status: DISCONTINUED | OUTPATIENT
Start: 2022-12-04 | End: 2022-12-07

## 2022-12-04 RX ORDER — LOSARTAN POTASSIUM 100 MG/1
50 TABLET, FILM COATED ORAL DAILY
Refills: 0 | Status: DISCONTINUED | OUTPATIENT
Start: 2022-12-05 | End: 2022-12-05

## 2022-12-04 RX ORDER — NIFEDIPINE 30 MG
90 TABLET, EXTENDED RELEASE 24 HR ORAL DAILY
Refills: 0 | Status: DISCONTINUED | OUTPATIENT
Start: 2022-12-04 | End: 2022-12-04

## 2022-12-04 RX ORDER — LOSARTAN POTASSIUM 100 MG/1
50 TABLET, FILM COATED ORAL ONCE
Refills: 0 | Status: COMPLETED | OUTPATIENT
Start: 2022-12-04 | End: 2022-12-04

## 2022-12-04 RX ORDER — NIFEDIPINE 30 MG
60 TABLET, EXTENDED RELEASE 24 HR ORAL DAILY
Refills: 0 | Status: DISCONTINUED | OUTPATIENT
Start: 2022-12-04 | End: 2022-12-04

## 2022-12-04 RX ORDER — NIFEDIPINE 30 MG
30 TABLET, EXTENDED RELEASE 24 HR ORAL ONCE
Refills: 0 | Status: COMPLETED | OUTPATIENT
Start: 2022-12-04 | End: 2022-12-04

## 2022-12-04 RX ADMIN — Medication 600 MILLIGRAM(S): at 13:16

## 2022-12-04 RX ADMIN — Medication 600 MILLIGRAM(S): at 21:04

## 2022-12-04 RX ADMIN — NICARDIPINE HYDROCHLORIDE 25 MG/HR: 30 CAPSULE, EXTENDED RELEASE ORAL at 21:04

## 2022-12-04 RX ADMIN — Medication 600 MILLIGRAM(S): at 02:38

## 2022-12-04 RX ADMIN — PANTOPRAZOLE SODIUM 40 MILLIGRAM(S): 20 TABLET, DELAYED RELEASE ORAL at 06:12

## 2022-12-04 RX ADMIN — Medication 600 MILLIGRAM(S): at 08:26

## 2022-12-04 RX ADMIN — ENOXAPARIN SODIUM 40 MILLIGRAM(S): 100 INJECTION SUBCUTANEOUS at 06:12

## 2022-12-04 RX ADMIN — CHLORHEXIDINE GLUCONATE 1 APPLICATION(S): 213 SOLUTION TOPICAL at 06:08

## 2022-12-04 RX ADMIN — Medication 60 MILLIGRAM(S): at 06:12

## 2022-12-04 RX ADMIN — Medication 30 MILLIGRAM(S): at 09:07

## 2022-12-04 RX ADMIN — LOSARTAN POTASSIUM 50 MILLIGRAM(S): 100 TABLET, FILM COATED ORAL at 14:52

## 2022-12-04 RX ADMIN — PANTOPRAZOLE SODIUM 40 MILLIGRAM(S): 20 TABLET, DELAYED RELEASE ORAL at 17:40

## 2022-12-04 RX ADMIN — NICARDIPINE HYDROCHLORIDE 25 MG/HR: 30 CAPSULE, EXTENDED RELEASE ORAL at 02:20

## 2022-12-04 NOTE — PROGRESS NOTE ADULT - SUBJECTIVE AND OBJECTIVE BOX
Patient seen and examined at the bedside.    Remained critically ill on continuous ICU monitoring.    OBJECTIVE:  Vital Signs Last 24 Hrs  T(C): 37 (04 Dec 2022 04:00), Max: 37.1 (04 Dec 2022 00:00)  T(F): 98.6 (04 Dec 2022 04:00), Max: 98.7 (04 Dec 2022 00:00)  HR: 77 (04 Dec 2022 05:00) (66 - 83)  BP: 105/55 (03 Dec 2022 09:30) (93/54 - 105/55)  BP(mean): 78 (03 Dec 2022 09:30) (69 - 78)  RR: 17 (04 Dec 2022 05:00) (13 - 42)  SpO2: 96% (04 Dec 2022 05:00) (91% - 98%)    Parameters below as of 04 Dec 2022 04:00  Patient On (Oxygen Delivery Method): nasal cannula  O2 Flow (L/min): 3        Physical Exam:   General: NAD   Neurology: nonfocal   Eyes: bilateral pupils equal and reactive   ENT/Neck: Neck supple, trachea midline, No JVD   Respiratory: Clear bilaterally   CV: S1S2, no murmurs        [x] Sternal dressing,        [x] Sinus rhythm,   Abdominal: Soft, NT, ND +BS  Extremities: negative pedal edema noted, + peripheral pulses   Skin: No Rashes, Hematoma, Ecchymosis                         Assessment:  57-year-old male with past medical history hypertension, "arrhythmia" presents with bilateral chest pain radiating to the back over the past 2 days.  Patient reports severe bilateral back pain with radiation to the chest, reports occasional radiation to left lower extremity over the past 2 days.  Patient reports history of hypertension, states noncompliant with medications.  Denies any fevers, abdominal pain, vomiting, diarrhea, bloody stools, black tarry stools, or rash.  No recent injury or trauma.  Denies any additional complaints. CT scan completed and found to have a Type B aortic dissection . Pt with hypertension emergency.  started on Cardene gtt and esmolol gtt. after ct scan , pt was transferred to [place] for further medical and surgical management.       Transfer from [facility] Type B dissection  Hemodynamic instability    Plan:   ***Neuro***  [x] Nonfocal    Post operative neuro assessment     ***Cardiovascular***  Invasive hemodynamic monitoring, assess perfusion indices   SR / MAP 76 / Hct 32.7/ Lactate 0.7  [x]  Cardene currently off  PO Labetalol and Procardia   [x] VTE prophylaxis with Enoxaparin   Continuos reassessment of hemodynamics     ***Pulmonary***  [x] 3L NC  Continue to monitor SpO2 via pulse oximetry  Encourage bedside spirometry     ***GI***  [x] Diet: CC  [x] Protonix      ***Renal***  ALYSA  Continue to monitor I/Os, BUN/Creatinine.   Replete lytes PRN    ***ID***  No active antibiotic coverage      ***Endocrine***             - Continue monitoring blood glucose closely for need to initiate sliding scale           Patient requires continuous monitoring with bedside rhythm monitoring, pulse oximetry monitoring, and continuous central venous and arterial pressure monitoring; and intermittent blood gas analysis. Care plan discussed with the ICU care team.   Patient remained critical, at risk for life threatening decompensation.    I have spent 30 minutes providing critical care management to this patient.    By signing my name below, I, Maria D'Amico, attest that this documentation has been prepared under the direction and in the presence of Félix Alejandre NP   Electronically signed: Maria D'Amico, Scribe, 12-04-22 @ 07:36    I, Félix Alejandre NP , personally performed the services described in this documentation. all medical record entries made by the evonneibe were at my direction and in my presence. I have reviewed the chart and agree that the record reflects my personal performance and is accurate and complete  Electronically signed: Félix Alejandre NP  Patient seen and examined at the bedside.    Remained critically ill on continuous ICU monitoring.    OBJECTIVE:  Vital Signs Last 24 Hrs  T(C): 37 (04 Dec 2022 04:00), Max: 37.1 (04 Dec 2022 00:00)  T(F): 98.6 (04 Dec 2022 04:00), Max: 98.7 (04 Dec 2022 00:00)  HR: 77 (04 Dec 2022 05:00) (66 - 83)  BP: 105/55 (03 Dec 2022 09:30) (93/54 - 105/55)  BP(mean): 78 (03 Dec 2022 09:30) (69 - 78)  RR: 17 (04 Dec 2022 05:00) (13 - 42)  SpO2: 96% (04 Dec 2022 05:00) (91% - 98%)    Parameters below as of 04 Dec 2022 04:00  Patient On (Oxygen Delivery Method): nasal cannula  O2 Flow (L/min): 3        Physical Exam:   General: NAD   Neurology: nonfocal   Eyes: bilateral pupils equal and reactive   ENT/Neck: Neck supple, trachea midline, No JVD   Respiratory: Clear bilaterally   CV: S1S2, no murmurs        [x] Sternal dressing,        [x] Sinus rhythm,   Abdominal: Soft, NT, ND +BS  Extremities: negative pedal edema noted, + peripheral pulses   Skin: No Rashes, Hematoma, Ecchymosis                         Assessment:  57-year-old male with past medical history hypertension, "arrhythmia" presents with bilateral chest pain radiating to the back over the past 2 days.  Patient reports severe bilateral back pain with radiation to the chest, reports occasional radiation to left lower extremity over the past 2 days.  Patient reports history of hypertension, states noncompliant with medications.  Denies any fevers, abdominal pain, vomiting, diarrhea, bloody stools, black tarry stools, or rash.  No recent injury or trauma.  Denies any additional complaints. CT scan completed and found to have a Type B aortic dissection . Pt with hypertension emergency.  started on Cardene gtt and esmolol gtt. after ct scan , pt was transferred to [place] for further medical and surgical management.       Transfer from [facility] Type B dissection  Hemodynamic instability    Plan:   ***Neuro***  [x] Nonfocal    Post operative neuro assessment     ***Cardiovascular***  Invasive hemodynamic monitoring, assess perfusion indices   SR / MAP 76 / Hct 32.7/ Lactate 0.7  [x] Cardene 5 -> 7 mg  PO Labetalol for blood pressure management   [x] VTE prophylaxis with Enoxaparin   Continuos reassessment of hemodynamics     ***Pulmonary***  [x] 3L NC  Continue to monitor SpO2 via pulse oximetry  Encourage bedside spirometry     ***GI***  [x] Diet: CC  [x] Protonix      ***Renal***  ALYSA  Continue to monitor I/Os, BUN/Creatinine.   Replete lytes PRN    ***ID***  No active antibiotic coverage      ***Endocrine***             - Continue monitoring blood glucose closely for need to initiate sliding scale           Patient requires continuous monitoring with bedside rhythm monitoring, pulse oximetry monitoring, and continuous central venous and arterial pressure monitoring; and intermittent blood gas analysis. Care plan discussed with the ICU care team.   Patient remained critical, at risk for life threatening decompensation.    I have spent 30 minutes providing critical care management to this patient.    By signing my name below, I, Maria D'Amico, attest that this documentation has been prepared under the direction and in the presence of Félix Alejandre NP   Electronically signed: Maria D'Amico, Scribe, 12-04-22 @ 07:36    I, Félix Alejandre NP , personally performed the services described in this documentation. all medical record entries made by the evonneibjj were at my direction and in my presence. I have reviewed the chart and agree that the record reflects my personal performance and is accurate and complete  Electronically signed: Félix Alejandre NP  Patient seen and examined at the bedside.    Remained critically ill on continuous ICU monitoring.    OBJECTIVE:  Vital Signs Last 24 Hrs  T(C): 37 (04 Dec 2022 04:00), Max: 37.1 (04 Dec 2022 00:00)  T(F): 98.6 (04 Dec 2022 04:00), Max: 98.7 (04 Dec 2022 00:00)  HR: 77 (04 Dec 2022 05:00) (66 - 83)  BP: 105/55 (03 Dec 2022 09:30) (93/54 - 105/55)  BP(mean): 78 (03 Dec 2022 09:30) (69 - 78)  RR: 17 (04 Dec 2022 05:00) (13 - 42)  SpO2: 96% (04 Dec 2022 05:00) (91% - 98%)    Parameters below as of 04 Dec 2022 04:00  Patient On (Oxygen Delivery Method): nasal cannula  O2 Flow (L/min): 3        Physical Exam:   General: NAD   Neurology: nonfocal   Eyes: bilateral pupils equal and reactive   ENT/Neck: Neck supple, trachea midline, No JVD   Respiratory: Clear bilaterally   CV: S1S2, no murmurs        [x] Sternal dressing,        [x] Sinus rhythm,   Abdominal: Soft, NT, ND +BS  Extremities: negative pedal edema noted, + peripheral pulses   Skin: No Rashes, Hematoma, Ecchymosis                         Assessment:  57-year-old male with past medical history hypertension, "arrhythmia" presents with bilateral chest pain radiating to the back over the past 2 days.  Patient reports severe bilateral back pain with radiation to the chest, reports occasional radiation to left lower extremity over the past 2 days.  Patient reports history of hypertension, states noncompliant with medications.  Denies any fevers, abdominal pain, vomiting, diarrhea, bloody stools, black tarry stools, or rash.  No recent injury or trauma.  Denies any additional complaints. CT scan completed and found to have a Type B aortic dissection . Pt with hypertension emergency.  started on Cardene gtt and esmolol gtt. after ct scan , pt was transferred to [place] for further medical and surgical management.       Transfer from [facility] Type B dissection  Hemodynamic instability    Plan:   ***Neuro***  [x] Nonfocal       ***Cardiovascular***  Invasive hemodynamic monitoring, assess perfusion indices   SR / MAP 76 / Hct 32.7/ Lactate 0.7  [x] Cardene currently weaned off after titration of oral agents   Anti-impulse therapy with Labetalol PO and Procardia. Titrated for hemodynamic parameter goals   [x] VTE prophylaxis with Enoxaparin   Continuos reassessment of hemodynamics     ***Pulmonary***  [x] 3L NC, will wean now that patient is off nicardipine and no longer with evidence of shunting   Continue to monitor SpO2 via pulse oximetry  Encourage bedside spirometry     ***GI***  [x] Diet: CC  [x] Protonix      ***Renal***  ALYSA  Continue to monitor I/Os, BUN/Creatinine.   Replete lytes PRN    ***ID***  No active antibiotic coverage      ***Endocrine***             - Continue monitoring blood glucose closely for need to initiate sliding scale           Patient requires continuous monitoring with bedside rhythm monitoring, pulse oximetry monitoring, and continuous central venous and arterial pressure monitoring; and intermittent blood gas analysis. Care plan discussed with the ICU care team.   Patient remained critical, at risk for life threatening decompensation.    I have spent 30 minutes providing critical care management to this patient.    By signing my name below, I, Maria D'Amico, attest that this documentation has been prepared under the direction and in the presence of Féilx Alejandre NP   Electronically signed: Maria D'Amico, Scribe, 12-04-22 @ 07:36    IFélix NP , personally performed the services described in this documentation. all medical record entries made by the isiah were at my direction and in my presence. I have reviewed the chart and agree that the record reflects my personal performance and is accurate and complete  Electronically signed: Félix Alejandre NP

## 2022-12-05 LAB
ALBUMIN SERPL ELPH-MCNC: 3.3 G/DL — SIGNIFICANT CHANGE UP (ref 3.3–5)
ALP SERPL-CCNC: 69 U/L — SIGNIFICANT CHANGE UP (ref 40–120)
ALT FLD-CCNC: 19 U/L — SIGNIFICANT CHANGE UP (ref 10–45)
ANION GAP SERPL CALC-SCNC: 11 MMOL/L — SIGNIFICANT CHANGE UP (ref 5–17)
AST SERPL-CCNC: 20 U/L — SIGNIFICANT CHANGE UP (ref 10–40)
BILIRUB SERPL-MCNC: 0.5 MG/DL — SIGNIFICANT CHANGE UP (ref 0.2–1.2)
BUN SERPL-MCNC: 25 MG/DL — HIGH (ref 7–23)
CALCIUM SERPL-MCNC: 8.3 MG/DL — LOW (ref 8.4–10.5)
CHLORIDE SERPL-SCNC: 104 MMOL/L — SIGNIFICANT CHANGE UP (ref 96–108)
CO2 SERPL-SCNC: 24 MMOL/L — SIGNIFICANT CHANGE UP (ref 22–31)
CREAT SERPL-MCNC: 1.17 MG/DL — SIGNIFICANT CHANGE UP (ref 0.5–1.3)
EGFR: 73 ML/MIN/1.73M2 — SIGNIFICANT CHANGE UP
GLUCOSE SERPL-MCNC: 108 MG/DL — HIGH (ref 70–99)
HCT VFR BLD CALC: 33.1 % — LOW (ref 39–50)
HGB BLD-MCNC: 10.4 G/DL — LOW (ref 13–17)
MAGNESIUM SERPL-MCNC: 2.1 MG/DL — SIGNIFICANT CHANGE UP (ref 1.6–2.6)
MCHC RBC-ENTMCNC: 25.6 PG — LOW (ref 27–34)
MCHC RBC-ENTMCNC: 31.4 GM/DL — LOW (ref 32–36)
MCV RBC AUTO: 81.3 FL — SIGNIFICANT CHANGE UP (ref 80–100)
NRBC # BLD: 0 /100 WBCS — SIGNIFICANT CHANGE UP (ref 0–0)
PHOSPHATE SERPL-MCNC: 3.8 MG/DL — SIGNIFICANT CHANGE UP (ref 2.5–4.5)
PLATELET # BLD AUTO: 150 K/UL — SIGNIFICANT CHANGE UP (ref 150–400)
POTASSIUM SERPL-MCNC: 4.2 MMOL/L — SIGNIFICANT CHANGE UP (ref 3.5–5.3)
POTASSIUM SERPL-SCNC: 4.2 MMOL/L — SIGNIFICANT CHANGE UP (ref 3.5–5.3)
PROT SERPL-MCNC: 6 G/DL — SIGNIFICANT CHANGE UP (ref 6–8.3)
RBC # BLD: 4.07 M/UL — LOW (ref 4.2–5.8)
RBC # FLD: 16.2 % — HIGH (ref 10.3–14.5)
SODIUM SERPL-SCNC: 139 MMOL/L — SIGNIFICANT CHANGE UP (ref 135–145)
WBC # BLD: 5.45 K/UL — SIGNIFICANT CHANGE UP (ref 3.8–10.5)
WBC # FLD AUTO: 5.45 K/UL — SIGNIFICANT CHANGE UP (ref 3.8–10.5)

## 2022-12-05 PROCEDURE — 99232 SBSQ HOSP IP/OBS MODERATE 35: CPT

## 2022-12-05 PROCEDURE — 71045 X-RAY EXAM CHEST 1 VIEW: CPT | Mod: 26

## 2022-12-05 PROCEDURE — 99291 CRITICAL CARE FIRST HOUR: CPT

## 2022-12-05 RX ORDER — LOSARTAN POTASSIUM 100 MG/1
100 TABLET, FILM COATED ORAL
Refills: 0 | Status: DISCONTINUED | OUTPATIENT
Start: 2022-12-05 | End: 2022-12-06

## 2022-12-05 RX ADMIN — PANTOPRAZOLE SODIUM 40 MILLIGRAM(S): 20 TABLET, DELAYED RELEASE ORAL at 05:14

## 2022-12-05 RX ADMIN — Medication 600 MILLIGRAM(S): at 07:51

## 2022-12-05 RX ADMIN — ENOXAPARIN SODIUM 40 MILLIGRAM(S): 100 INJECTION SUBCUTANEOUS at 05:18

## 2022-12-05 RX ADMIN — CHLORHEXIDINE GLUCONATE 1 APPLICATION(S): 213 SOLUTION TOPICAL at 05:14

## 2022-12-05 RX ADMIN — Medication 600 MILLIGRAM(S): at 13:18

## 2022-12-05 RX ADMIN — Medication 1.25 MILLIGRAM(S): at 05:13

## 2022-12-05 RX ADMIN — LOSARTAN POTASSIUM 100 MILLIGRAM(S): 100 TABLET, FILM COATED ORAL at 05:13

## 2022-12-05 RX ADMIN — Medication 600 MILLIGRAM(S): at 20:52

## 2022-12-05 RX ADMIN — Medication 90 MILLIGRAM(S): at 05:14

## 2022-12-05 RX ADMIN — Medication 600 MILLIGRAM(S): at 01:10

## 2022-12-05 NOTE — CONSULT NOTE ADULT - SUBJECTIVE AND OBJECTIVE BOX
NEPHROLOGY - NSN    Patient seen and examined.    HPI:   57-year-old male with past medical history hypertension, "arrhythmia" presents with bilateral chest pain radiating to the back over the past 2 days.  Patient reports severe bilateral back pain with radiation to the chest, reports occasional radiation to left lower extremity over the past 2 days.  Patient reports history of hypertension, states noncompliant with medications.  Denies any fevers, abdominal pain, vomiting, diarrhea, bloody stools, black tarry stools, or rash.  No recent injury or trauma.  Denies any additional complaints.    CT scan completed and found to have a Type B aortic dissection . Pt with hypertension emergency.   started on Cardene gtt and esmolol gtt.   after ct scan , pt was transferred to [place] for further medical and surgical management.  (02 Dec 2022 00:28)      PAST MEDICAL & SURGICAL HISTORY:  HTN (hypertension)          MEDICATIONS  (STANDING):  chlorhexidine 2% Cloths 1 Application(s) Topical <User Schedule>  enoxaparin Injectable 40 milliGRAM(s) SubCutaneous every 24 hours  labetalol 600 milliGRAM(s) Oral every 6 hours  losartan 100 milliGRAM(s) Oral <User Schedule>  niCARdipine Infusion 5 mG/Hr (25 mL/Hr) IV Continuous <Continuous>  NIFEdipine XL 90 milliGRAM(s) Oral daily  pantoprazole    Tablet 40 milliGRAM(s) Oral before breakfast      Allergies    No Known Allergies    Intolerances        SOCIAL HISTORY:  Denies alcohol abuse, drug abuse or tobacco usage.     FAMILY HISTORY:      VITALS:  T(C): 36.8 (12-05-22 @ 08:00), Max: 36.9 (12-05-22 @ 00:00)  HR: 72 (12-05-22 @ 08:00) (65 - 86)  BP: 110/55 (12-05-22 @ 08:00) (99/50 - 110/55)  RR: 20 (12-05-22 @ 08:00) (12 - 48)  SpO2: 97% (12-05-22 @ 08:00) (94% - 99%)    REVIEW OF SYSTEMS:  Denies any nausea, vomiting, diarrhea, fever or chills. Denies chest pain, SOB, focal weakness, hematuria or dysuria. Good oral intake and denies fatigue or weakness. All other pertinent systems are reviewed and are negative.    PHYSICAL EXAM:  Constitutional: NAD  HEENT: EOMI  Neck:  No JVD, supple   Respiratory: CTA B/L  Cardiovascular: S1 and S2, RRR  Gastrointestinal: + BS, soft, NT, ND  Extremities: No peripheral edema, + peripheral pulses  Neurological: A/O x 3, CN2-12 intact  Psychiatric: Normal mood, normal affect  : No Diaz  Skin: No rashes, C/D/I  Access: Not applicable    I and O's:    12-03 @ 07:01  -  12-04 @ 07:00  --------------------------------------------------------  IN: 1420 mL / OUT: 1980 mL / NET: -560 mL    12-04 @ 07:01  -  12-05 @ 07:00  --------------------------------------------------------  IN: 1635 mL / OUT: 1700 mL / NET: -65 mL    12-05 @ 07:01  -  12-05 @ 08:32  --------------------------------------------------------  IN: 240 mL / OUT: 0 mL / NET: 240 mL          LABS:                        10.4   5.45  )-----------( 150      ( 05 Dec 2022 00:56 )             33.1     12-05    139  |  104  |  25<H>  ----------------------------<  108<H>  4.2   |  24  |  1.17    Ca    8.3<L>      05 Dec 2022 00:56  Phos  3.8     12-05  Mg     2.1     12-05    TPro  6.0  /  Alb  3.3  /  TBili  0.5  /  DBili  x   /  AST  20  /  ALT  19  /  AlkPhos  69  12-05      URINE:      RADIOLOGY & ADDITIONAL STUDIES:     NEPHROLOGY - NSN    Patient seen and examined.    HPI:   57-year-old male with past medical history hypertension, "arrhythmia" presents with bilateral chest pain radiating to the back over the past 2 days.  Patient reports severe bilateral back pain with radiation to the chest, reports occasional radiation to left lower extremity over the past 2 days.  Patient reports history of hypertension, states noncompliant with medications.  Denies any fevers, abdominal pain, vomiting, diarrhea, bloody stools, black tarry stools, or rash.  No recent injury or trauma.  Denies any additional complaints.    CT scan completed and found to have a Type B aortic dissection . Pt with hypertension emergency.   started on Cardene gtt and esmolol gtt.   after ct scan , pt was transferred to [place] for further medical and surgical management.  (02 Dec 2022 00:28)  Pt is not the greatest historian.  He states that he was diagnosed with HTN but took no medications.  His mother has HTN and dad did as well( )  No NSAIDS or herbal meds   There is no hematuria or bubbles in the urine.  No history of NSAIDS or nephrolithisis.  The patient urinates once or twice in the night and there is no incontinence.  No family hx or renal disease or back pain.    No recent abx use.  No alleviating or aggravating factors with respect to the kidneys.     PAST MEDICAL & SURGICAL HISTORY:  HTN (hypertension)          MEDICATIONS  (STANDING):  chlorhexidine 2% Cloths 1 Application(s) Topical <User Schedule>  enoxaparin Injectable 40 milliGRAM(s) SubCutaneous every 24 hours  labetalol 600 milliGRAM(s) Oral every 6 hours  losartan 100 milliGRAM(s) Oral <User Schedule>  niCARdipine Infusion 5 mG/Hr (25 mL/Hr) IV Continuous <Continuous>  NIFEdipine XL 90 milliGRAM(s) Oral daily  pantoprazole    Tablet 40 milliGRAM(s) Oral before breakfast      Allergies    No Known Allergies    Intolerances        SOCIAL HISTORY:  Denies alcohol abuse, drug abuse or tobacco usage.     FAMILY HISTORY:      VITALS:  T(C): 36.8 (22 @ 08:00), Max: 36.9 (22 @ 00:00)  HR: 72 (22 @ 08:00) (65 - 86)  BP: 110/55 (22 @ 08:00) (99/50 - 110/55)  RR: 20 (22 @ 08:00) (12 - 48)  SpO2: 97% (22 @ 08:00) (94% - 99%)    REVIEW OF SYSTEMS:  Denies any nausea, vomiting, diarrhea, fever or chills. Denies chest pain, SOB, focal weakness, hematuria or dysuria. Good oral intake and denies fatigue or weakness. All other pertinent systems are reviewed and are negative.    PHYSICAL EXAM:  Constitutional: NAD  HEENT: EOMI  Neck:  No JVD, supple   Respiratory: CTA B/L  Cardiovascular: S1 and S2, RRR  Gastrointestinal: + BS, soft, NT, ND  Extremities: No peripheral edema, + peripheral pulses  Neurological: A/O x 3, CN2-12 intact  Psychiatric: Normal mood, normal affect  : No Diaz  Skin: No rashes, C/D/I  Access: Not applicable    I and O's:     @ 07:  -   @ 07:00  --------------------------------------------------------  IN: 1420 mL / OUT: 1980 mL / NET: -560 mL     @ 07:  -   @ 07:00  --------------------------------------------------------  IN: 1635 mL / OUT: 1700 mL / NET: -65 mL     @ 07:  -   @ 08:32  --------------------------------------------------------  IN: 240 mL / OUT: 0 mL / NET: 240 mL          LABS:                        10.4   5.45  )-----------( 150      ( 05 Dec 2022 00:56 )             33.1         139  |  104  |  25<H>  ----------------------------<  108<H>  4.2   |  24  |  1.17    Ca    8.3<L>      05 Dec 2022 00:56  Phos  3.8     12-  Mg     2.1         TPro  6.0  /  Alb  3.3  /  TBili  0.5  /  DBili  x   /  AST  20  /  ALT  19  /  AlkPhos  69        URINE:      RADIOLOGY & ADDITIONAL STUDIES:    < from: Xray Chest 1 View- PORTABLE-Routine (Xray Chest 1 View- PORTABLE-Routine in AM.) (22 @ 03:58) >    ACC: [id number] EXAM:  XR CHEST PORTABLE ROUTINE 1V                          PROCEDURE DATE:  2022          INTERPRETATION:  EXAMINATION: XR CHEST    CLINICAL INDICATION: cts    TECHNIQUE: Single frontal, portable view of the chest was obtained.    COMPARISON: Chest X-Ray 12/3/2022.    FINDINGS:      The cardiomediastinal silhouette is enlarged.  Mild improvement of patchy perihilar airspace opacities bilaterally and   increased bronchovascular markings.  There is no pleural effusion.  There is no pneumothorax.  No acute bony abnormality.    IMPRESSION:  Similar enlargement of cardiomediastinal silhouette.  Mild improvement of pulmonary edema.    --- End of Report ---          PRASHANTH NICHOLS MD; Resident Radiologist  This document has beenelectronically signed.  LETI PURDY MD; Attending Radiologist  This document has been electronically signed. Dec  4 2022 10:02AM    < end of copied text >  < from: CT Angio Abdomen and Pelvis w/ IV Cont (22 @ 21:35) >    ACC: [id number] EXAM:  CT ANGIO ABD PELV (W)AW IC                        ACC: [id number] EXAM:  CT ANGIO CHEST AORTA WAWIC                          PROCEDURE DATE:  2022          INTERPRETATION:  INDICATION: Chest and back pain    TECHNIQUE: CT angiogram of the chest was obtained after the intravenous   administration of 90 cc administered mL of Omnipaque 350 (accession   [id number]), IV contrast documented in associated exam (accession   [id number]), 10 cc discarded discarded. Three-dimensional maximum intensity   projection images were generated.    COMPARISON: None    FINDINGS:  Aorta angiogram: Normal caliber aorta. Type B aortic dissection arises   just distal to the left subclavian artery extending into the right common   iliac artery and left external iliac artery. The true lumen supplies the   celiac, superior mesenteric, bilateral renal, and inferior mesenteric   arteries. There is narrowing of the proximal celiac artery with   indentation by the median arcuate ligament and post stenotic dilatation.  Lymph nodes: No lymphadenopathy.  Heart/vasculature: Heart size is within normal limits. No pericardial   effusion.  Airways/lungs/pleura: Bilateral lower lobe patchy atelectasis. No pleural   effusion.    Abdomen/pelvis:  Right renal cyst and subcentimeter bilateral hypodense renal lesions, too   small to characterize. Symmetric renal enhancement without hydronephrosis.    Liver, spleen, gallbladder, pancreas, and adrenal glands are within   normal limits.    No acute bowel pathology. No bowel obstruction.    Mildly enlarged prostate gland.    Urinary bladder is within normal limits.    No ascites. No abdominal or pelvic lymphadenopathy.    Tiny fat-containing umbilical and left inguinal hernias.    Bones/soft tissues: Degenerative changes of the spine.    IMPRESSION:  Type B aortic dissection extending from just beyond the origin of the   left subclavian artery extending into the right common iliac and left   external iliac arteries.    Findings were discussed with Dr. Wells by Dr. Estevez on 2022 at   10:15 PM with read back confirmation.    --- End of Report ---            TONYA ESTEVEZ MD; Attending Radiologist  This document has been electronically signed. Dec  1 2022 10:14PM    < end of copied text >

## 2022-12-05 NOTE — CONSULT NOTE ADULT - ASSESSMENT
57-year-old male with past medical history hypertension pw acute type B dissection  left kidney through the true lumen but there is symmetrical enhancements of both kidneys   Glucose intolerance   ALYSA - Could be a combination of contrast and BP control     1 [facility]-Echo confirmed LVH ensuing chronicity to the HTN (likely present > 10 yrs)   Check Renin/Aldosterone/catecholamines and urine metanephrines  May decide on renal dopplers but he has a strong family hx of HTN   2 Renal- Aldactone 25mg po qd may be a good long term option   Cozaar is a weak ARB and Valsartan 320 mg is a better choice   He will have feeling of fatigue for 2-3 weeks with aggressive BP control   3 Misc-Wt loss and trend HGA1C       [facility]ayed [facility]   [phone]

## 2022-12-05 NOTE — PROGRESS NOTE ADULT - SUBJECTIVE AND OBJECTIVE BOX
· Subjective and Objective:   Patient seen and examined at the bedside.    Remained critically ill on continuous ICU monitoring.    OBJECTIVE:  ICU Vital Signs Last 24 Hrs  T(C): 36.8 (05 Dec 2022 08:00), Max: 36.9 (05 Dec 2022 00:00)  T(F): 98.3 (05 Dec 2022 08:00), Max: 98.5 (05 Dec 2022 00:00)  HR: 67 (05 Dec 2022 11:00) (67 - 86)  BP: 113/56 (05 Dec 2022 11:00) (106/55 - 113/56)  BP(mean): 78 (05 Dec 2022 11:00) (74 - 78)  ABP: 127/55 (05 Dec 2022 11:00) (98/51 - 304/301)  ABP(mean): 75 (05 Dec 2022 11:00) (65 - 302)  RR: 18 (05 Dec 2022 11:00) (12 - 47)  SpO2: 97% (05 Dec 2022 11:00) (94% - 99%)    O2 Parameters below as of 05 Dec 2022 08:00  Patient On (Oxygen Delivery Method): nasal cannula  O2 Flow (L/min): 3        Physical Exam:   General: NAD   Neurology: nonfocal   Eyes: bilateral pupils equal and reactive   ENT/Neck: Neck supple, trachea midline, No JVD   Respiratory: Clear bilaterally   CV: S1S2, no murmurs        [x] Sinus rhythm   Abdominal: Soft, NT, ND +BS  Extremities: negative pedal edema noted, + peripheral pulses   Skin: No Rashes, Hematoma, Ecchymosis                         Assessment:  57-year-old male with past medical history hypertension, "arrhythmia" presents with bilateral chest pain radiating to the back over the past 2 days.  Patient reports severe bilateral back pain with radiation to the chest, reports occasional radiation to left lower extremity over the past 2 days.  Patient reports history of hypertension, states noncompliant with medications.  Denies any fevers, abdominal pain, vomiting, diarrhea, bloody stools, black tarry stools, or rash.  No recent injury or trauma.  Denies any additional complaints. CT scan completed and found to have a Type B aortic dissection . Pt with hypertension emergency.  started on Cardene gtt and esmolol gtt. after ct scan , pt was transferred to [place] for further medical and surgical management.       Transfer from [facility] Type B dissection  Hemodynamic instability    Plan:   ***Neuro***  [x] Nonfocal       ***Cardiovascular***  Invasive hemodynamic monitoring, assess perfusion indices   SR / MAP 76 / Hct 32.7/ Lactate 0.7  [x] Cardene currently weaned off after titration of oral agents   Anti-impulse therapy with Labetalol PO and Procardia. Losartan added yesterday afternoon and titrated overnight. Titrated for hemodynamic parameter goals   [x] VTE prophylaxis with Enoxaparin       ***Pulmonary***  [x] RA  Continue to monitor SpO2 via pulse oximetry  Encourage bedside spirometry     ***GI***  [x] Diet: CC  [x] Protonix      ***Renal***  ALYSA  Continue to monitor I/Os, BUN/Creatinine.   Replete lytes PRN    ***ID***  No active antibiotic coverage      ***Endocrine***             - Continue monitoring blood glucose closely for need to initiate sliding scale           Patient requires continuous monitoring with bedside rhythm monitoring, pulse oximetry monitoring, and continuous central venous and arterial pressure monitoring; and intermittent blood gas analysis. Care plan discussed with the ICU care team.   Patient remained critical, at risk for life threatening decompensation.    I have spent 30 minutes providing critical care management to this patient    IFélix NP , personally performed the services described in this documentation.   Electronically signed: Félix Alejandre NP

## 2022-12-05 NOTE — PROGRESS NOTE ADULT - SUBJECTIVE AND OBJECTIVE BOX
Patient seen and examined at the bedside.    Remained critically ill on continuous ICU monitoring.    OBJECTIVE:  Vital Signs Last 24 Hrs  T(C): 37.7 (05 Dec 2022 20:00), Max: 37.7 (05 Dec 2022 20:00)  T(F): 99.8 (05 Dec 2022 20:00), Max: 99.8 (05 Dec 2022 20:00)  HR: 88 (05 Dec 2022 19:00) (67 - 88)  BP: 135/64 (05 Dec 2022 19:00) (106/55 - 135/64)  BP(mean): 92 (05 Dec 2022 19:00) (74 - 92)  RR: 31 (05 Dec 2022 19:00) (13 - 34)  SpO2: 95% (05 Dec 2022 19:00) (94% - 99%)    Parameters below as of 05 Dec 2022 20:00  Patient On (Oxygen Delivery Method): nasal cannula  O2 Flow (L/min): 3        Physical Exam:   General: NAD   Neurology: nonfocal   Eyes: bilateral pupils equal and reactive   ENT/Neck: Neck supple, trachea midline, No JVD   Respiratory: Clear bilaterally   CV: S1S2, no murmurs        [x] Sinus rhythm   Abdominal: Soft, NT, ND +BS  Extremities: negative pedal edema noted, + peripheral pulses   Skin: No Rashes, Hematoma, Ecchymosis                                          Assessment:  57-year-old male with past medical history hypertension, "arrhythmia" presents with bilateral chest pain radiating to the back over the past 2 days.  Patient reports severe bilateral back pain with radiation to the chest, reports occasional radiation to left lower extremity over the past 2 days.  Patient reports history of hypertension, states noncompliant with medications.  Denies any fevers, abdominal pain, vomiting, diarrhea, bloody stools, black tarry stools, or rash.  No recent injury or trauma.  Denies any additional complaints. CT scan completed and found to have a Type B aortic dissection . Pt with hypertension emergency.  started on Cardene gtt and esmolol gtt. after ct scan , pt was transferred to [place] for further medical and surgical management.       Transfer from [facility] Type B dissection  Hemodynamic instability        Plan:   ***Neuro***  [x] Nonfocal         ***Cardiovascular***  Invasive hemodynamic monitoring, assess perfusion indices   SR / MAP 74/ Hct 33.1/ Lactate 0.5  [x] Cardene currently weaned off after titration of oral agents   Anti-impulse therapy with Labetalol PO and Procardia. Losartan added yesterday afternoon and titrated overnight. Titrated for hemodynamic parameter goals   [x] VTE prophylaxis with Lovenox      ***Pulmonary***  [x] 3L NC  Encourage incentive spirometry, continue pulse ox monitoring, follow ABGs                 ***GI***  [x] Diet: CC  [x] Protonix    ***Renal***  [x] ALYSA  Continue to monitor I/Os, BUN/Creatinine.   Replete lytes PRN      ***ID***  No active antibiotic coverage      ***Endocrine***    - Continue monitoring blood glucose closely for need to initiate sliding scale         Patient requires continuous monitoring with bedside rhythm monitoring, pulse oximetry monitoring, and continuous central venous and arterial pressure monitoring; and intermittent blood gas analysis. Care plan discussed with the ICU care team.   Patient remained critical, at risk for life threatening decompensation.    I have spent 40 minutes providing critical care management to this patient.    By signing my name below, I, Kahlil Rendon, attest that this documentation has been prepared under the direction and in the presence of Bennett Whatley NP   Electronically signed: Isiah Velasquez, 12-05-22 @ 19:59    I, Bennett Whatley NP, personally performed the services described in this documentation. all medical record entries made by the isiah were at my direction and in my presence. I have reviewed the chart and agree that the record reflects my personal performance and is accurate and complete  Electronically signed: Bennett Whatley NP

## 2022-12-06 LAB
ALBUMIN SERPL ELPH-MCNC: 3.5 G/DL — SIGNIFICANT CHANGE UP (ref 3.3–5)
ALDOST SERPL-MCNC: 3.2 NG/DL — SIGNIFICANT CHANGE UP
ALP SERPL-CCNC: 82 U/L — SIGNIFICANT CHANGE UP (ref 40–120)
ALT FLD-CCNC: 19 U/L — SIGNIFICANT CHANGE UP (ref 10–45)
ANION GAP SERPL CALC-SCNC: 10 MMOL/L — SIGNIFICANT CHANGE UP (ref 5–17)
AST SERPL-CCNC: 23 U/L — SIGNIFICANT CHANGE UP (ref 10–40)
BILIRUB SERPL-MCNC: 0.8 MG/DL — SIGNIFICANT CHANGE UP (ref 0.2–1.2)
BLD GP AB SCN SERPL QL: NEGATIVE — SIGNIFICANT CHANGE UP
BUN SERPL-MCNC: 20 MG/DL — SIGNIFICANT CHANGE UP (ref 7–23)
CALCIUM SERPL-MCNC: 8.8 MG/DL — SIGNIFICANT CHANGE UP (ref 8.4–10.5)
CHLORIDE SERPL-SCNC: 102 MMOL/L — SIGNIFICANT CHANGE UP (ref 96–108)
CO2 SERPL-SCNC: 25 MMOL/L — SIGNIFICANT CHANGE UP (ref 22–31)
CREAT SERPL-MCNC: 1.06 MG/DL — SIGNIFICANT CHANGE UP (ref 0.5–1.3)
EGFR: 82 ML/MIN/1.73M2 — SIGNIFICANT CHANGE UP
GLUCOSE SERPL-MCNC: 109 MG/DL — HIGH (ref 70–99)
HCT VFR BLD CALC: 34 % — LOW (ref 39–50)
HGB BLD-MCNC: 10.8 G/DL — LOW (ref 13–17)
MAGNESIUM SERPL-MCNC: 2 MG/DL — SIGNIFICANT CHANGE UP (ref 1.6–2.6)
MCHC RBC-ENTMCNC: 25.7 PG — LOW (ref 27–34)
MCHC RBC-ENTMCNC: 31.8 GM/DL — LOW (ref 32–36)
MCV RBC AUTO: 80.8 FL — SIGNIFICANT CHANGE UP (ref 80–100)
NRBC # BLD: 0 /100 WBCS — SIGNIFICANT CHANGE UP (ref 0–0)
PHOSPHATE SERPL-MCNC: 3.9 MG/DL — SIGNIFICANT CHANGE UP (ref 2.5–4.5)
PLATELET # BLD AUTO: 162 K/UL — SIGNIFICANT CHANGE UP (ref 150–400)
POTASSIUM SERPL-MCNC: 4.2 MMOL/L — SIGNIFICANT CHANGE UP (ref 3.5–5.3)
POTASSIUM SERPL-SCNC: 4.2 MMOL/L — SIGNIFICANT CHANGE UP (ref 3.5–5.3)
PROT SERPL-MCNC: 6.5 G/DL — SIGNIFICANT CHANGE UP (ref 6–8.3)
RBC # BLD: 4.21 M/UL — SIGNIFICANT CHANGE UP (ref 4.2–5.8)
RBC # FLD: 16.3 % — HIGH (ref 10.3–14.5)
RH IG SCN BLD-IMP: POSITIVE — SIGNIFICANT CHANGE UP
SODIUM SERPL-SCNC: 137 MMOL/L — SIGNIFICANT CHANGE UP (ref 135–145)
WBC # BLD: 6.91 K/UL — SIGNIFICANT CHANGE UP (ref 3.8–10.5)
WBC # FLD AUTO: 6.91 K/UL — SIGNIFICANT CHANGE UP (ref 3.8–10.5)

## 2022-12-06 PROCEDURE — 71045 X-RAY EXAM CHEST 1 VIEW: CPT | Mod: 26

## 2022-12-06 PROCEDURE — 99232 SBSQ HOSP IP/OBS MODERATE 35: CPT

## 2022-12-06 PROCEDURE — ZZZZZ: CPT

## 2022-12-06 RX ORDER — METOPROLOL TARTRATE 50 MG
100 TABLET ORAL
Refills: 0 | Status: DISCONTINUED | OUTPATIENT
Start: 2022-12-06 | End: 2022-12-07

## 2022-12-06 RX ORDER — SPIRONOLACTONE 25 MG/1
25 TABLET, FILM COATED ORAL DAILY
Refills: 0 | Status: DISCONTINUED | OUTPATIENT
Start: 2022-12-06 | End: 2022-12-07

## 2022-12-06 RX ORDER — NIFEDIPINE 30 MG
30 TABLET, EXTENDED RELEASE 24 HR ORAL ONCE
Refills: 0 | Status: COMPLETED | OUTPATIENT
Start: 2022-12-06 | End: 2022-12-06

## 2022-12-06 RX ORDER — NIFEDIPINE 30 MG
120 TABLET, EXTENDED RELEASE 24 HR ORAL DAILY
Refills: 0 | Status: DISCONTINUED | OUTPATIENT
Start: 2022-12-07 | End: 2022-12-07

## 2022-12-06 RX ORDER — VALSARTAN 80 MG/1
320 TABLET ORAL DAILY
Refills: 0 | Status: DISCONTINUED | OUTPATIENT
Start: 2022-12-07 | End: 2022-12-07

## 2022-12-06 RX ADMIN — Medication 30 MILLIGRAM(S): at 07:02

## 2022-12-06 RX ADMIN — Medication 90 MILLIGRAM(S): at 05:30

## 2022-12-06 RX ADMIN — Medication 600 MILLIGRAM(S): at 01:17

## 2022-12-06 RX ADMIN — Medication 600 MILLIGRAM(S): at 08:32

## 2022-12-06 RX ADMIN — SPIRONOLACTONE 25 MILLIGRAM(S): 25 TABLET, FILM COATED ORAL at 10:16

## 2022-12-06 RX ADMIN — Medication 600 MILLIGRAM(S): at 14:03

## 2022-12-06 RX ADMIN — ENOXAPARIN SODIUM 40 MILLIGRAM(S): 100 INJECTION SUBCUTANEOUS at 05:37

## 2022-12-06 RX ADMIN — PANTOPRAZOLE SODIUM 40 MILLIGRAM(S): 20 TABLET, DELAYED RELEASE ORAL at 05:37

## 2022-12-06 RX ADMIN — CHLORHEXIDINE GLUCONATE 1 APPLICATION(S): 213 SOLUTION TOPICAL at 05:37

## 2022-12-06 RX ADMIN — Medication 100 MILLIGRAM(S): at 17:32

## 2022-12-06 RX ADMIN — LOSARTAN POTASSIUM 100 MILLIGRAM(S): 100 TABLET, FILM COATED ORAL at 05:30

## 2022-12-06 NOTE — PROGRESS NOTE ADULT - SUBJECTIVE AND OBJECTIVE BOX
VITAL SIGNS-Telemetry:  SR 70s  Vital Signs Last 24 Hrs  T(C): 37 (12-06-22 @ 07:00), Max: 37.7 (12-05-22 @ 20:00)  T(F): 98.6 (12-06-22 @ 07:00), Max: 99.8 (12-05-22 @ 20:00)  HR: 77 (12-06-22 @ 10:00) (67 - 89)  BP: 110/59 (12-06-22 @ 10:00) (110/59 - 145/68)  RR: 23 (12-06-22 @ 10:00) (18 - 34)  SpO2: 96% (12-06-22 @ 10:00) (91% - 99%)         12-05 @ 07:01  -  12-06 @ 07:00  --------------------------------------------------------  IN: 1230 mL / OUT: 1950 mL / NET: -720 mL    12-06 @ 07:01  -  12-06 @ 10:49  --------------------------------------------------------  IN: 120 mL / OUT: 400 mL / NET: -280 mL        PHYSICAL EXAM:  Neurology: alert and oriented x 3, nonfocal, no gross deficits  CV : S1S2  Lungs: CTA  Abdomen: soft, nontender, nondistended, positive bowel sounds, last bowel movement   +bm      Extremities:     no c/c/e    acetaminophen     Tablet .. 650 milliGRAM(s) Oral every 6 hours PRN  chlorhexidine 2% Cloths 1 Application(s) Topical <User Schedule>  enoxaparin Injectable 40 milliGRAM(s) SubCutaneous every 24 hours  labetalol 600 milliGRAM(s) Oral every 6 hours  pantoprazole    Tablet 40 milliGRAM(s) Oral before breakfast  spironolactone 25 milliGRAM(s) Oral daily    Physical Therapy Rec:   Home  [ x ]   Home w/ PT  [  ]  Rehab  [  ]  Discussed with Cardiothoracic Team at AM rounds.

## 2022-12-06 NOTE — PROGRESS NOTE ADULT - ASSESSMENT
57-year-old male with past medical history hypertension pw acute type B dissection  left kidney through the true lumen but there is symmetrical enhancements of both kidneys   Glucose intolerance   ALYSA - Could be a combination of contrast and BP control     1 [facility]-Echo confirmed LVH ensuing chronicity to the HTN (likely present > 10 yrs)   Check Renin/Aldosterone/catecholamines and urine metanephrines  May decide on renal dopplers but he has a strong family hx of HTN   2 Renal- Start Aldactone 25mg po qd may be a good long term option   DC Cozaar and switch to Valsartan 320 mg for am(got dose this am already)   If the BP is up today then start Norvasc 5mg po qd   He will have feeling of fatigue for 2-3 weeks with aggressive BP control   3 Misc-Wt loss and trend HGA1C       Sayed [facility]   [phone]     57-year-old male with past medical history hypertension pw acute type B dissection  left kidney through the true lumen but there is symmetrical enhancements of both kidneys   Glucose intolerance   ALYSA - Could be a combination of contrast and BP control     1 CVS-Echo confirmed LVH ensuing chronicity to the HTN (likely present > 10 yrs)   Check Renin/Aldosterone/catecholamines and urine metanephrines  May decide on renal dopplers but he has a strong family hx of HTN   2 Renal- Start Aldactone 25mg po qd may be a good long term option   DC Cozaar and switch to Valsartan 320 mg for am(got dose this am already)   Nifedipine increased already today   He will have feeling of fatigue for 2-3 weeks with aggressive BP control   3 Misc-Wt loss and trend HGA1C       [facility]   [phone]

## 2022-12-06 NOTE — PROGRESS NOTE ADULT - SUBJECTIVE AND OBJECTIVE BOX
Patient seen and examined at the bedside.    Remained critically ill on continuous ICU monitoring.    OBJECTIVE:  Vital Signs Last 24 Hrs  T(C): 37.1 (06 Dec 2022 04:00), Max: 37.7 (05 Dec 2022 20:00)  T(F): 98.8 (06 Dec 2022 04:00), Max: 99.8 (05 Dec 2022 20:00)  HR: 79 (06 Dec 2022 06:00) (67 - 89)  BP: 143/66 (06 Dec 2022 06:00) (106/55 - 145/68)  BP(mean): 95 (06 Dec 2022 06:00) (74 - 95)  RR: 27 (06 Dec 2022 06:00) (17 - 34)  SpO2: 97% (06 Dec 2022 06:00) (91% - 99%)    Parameters below as of 06 Dec 2022 04:00  Patient On (Oxygen Delivery Method): nasal cannula  O2 Flow (L/min): 3    Physical Exam:   General: NAD   Neurology: nonfocal   Eyes: bilateral pupils equal and reactive   ENT/Neck: Neck supple, trachea midline, No JVD   Respiratory: Clear bilaterally   CV: S1S2, no murmurs        [x] Sinus rhythm   Abdominal: Soft, NT, ND +BS  Extremities: negative pedal edema noted, + peripheral pulses   Skin: No Rashes, Hematoma, Ecchymosis                Assessment:  57-year-old male with past medical history hypertension, "arrhythmia" presents with bilateral chest pain radiating to the back over the past 2 days.  Patient reports severe bilateral back pain with radiation to the chest, reports occasional radiation to left lower extremity over the past 2 days.  Patient reports history of hypertension, states noncompliant with medications.  Denies any fevers, abdominal pain, vomiting, diarrhea, bloody stools, black tarry stools, or rash.  No recent injury or trauma.  Denies any additional complaints. CT scan completed and found to have a Type B aortic dissection . Pt with hypertension emergency.  started on Cardene gtt and esmolol gtt. after ct scan , pt was transferred to [place] for further medical and surgical management.       Transfer from [facility] Type B dissection  Hemodynamic instability    Plan:   ***Neuro***  [x] Nonfocal     ***Cardiovascular***  Invasive hemodynamic monitoring, assess perfusion indices   SR / MAP 95/ Hct 34.0%/ Lactate 0.5  Anti-impulse therapy with Labetalol PO,  Procardia, and Losartan. Titrated for hemodynamic parameter goals   [x] VTE prophylaxis with Lovenox    ***Pulmonary***  [x] 3L NC  Encourage incentive spirometry, continue pulse ox monitoring, follow ABGs             ***GI***  [x] Diet: CC  [x] Protonix    ***Renal***  [x] ALYSA  Continue to monitor I/Os, BUN/Creatinine.   Replete lytes PRN    ***ID***  No active antibiotic coverage.    ***Endocrine***  HbA1c 6.2%       - Continue monitoring blood glucose closely for need to initiate sliding scale         Patient requires continuous monitoring with bedside rhythm monitoring, pulse oximetry monitoring, and continuous central venous and arterial pressure monitoring; and intermittent blood gas analysis. Care plan discussed with the ICU care team.   Patient remained critical, at risk for life threatening decompensation.    I have spent 30 minutes providing critical care management to this patient.    By signing my name below, I, Terri Duran, attest that this documentation has been prepared under the direction and in the presence of Félix Alejandre NP.  Electronically signed: Ciera Landeros, 12-06-22 @ 06:51    I, Félix Alejandre, personally performed the services described in this documentation. all medical record entries made by the evonneibjj were at my direction and in my presence. I have reviewed the chart and agree that the record reflects my personal performance and is accurate and complete  Electronically signed: Félix Alejandre NP. Patient seen and examined at the bedside.    Remained critically ill on continuous ICU monitoring.    OBJECTIVE:  Vital Signs Last 24 Hrs  T(C): 37.1 (06 Dec 2022 04:00), Max: 37.7 (05 Dec 2022 20:00)  T(F): 98.8 (06 Dec 2022 04:00), Max: 99.8 (05 Dec 2022 20:00)  HR: 79 (06 Dec 2022 06:00) (67 - 89)  BP: 143/66 (06 Dec 2022 06:00) (106/55 - 145/68)  BP(mean): 95 (06 Dec 2022 06:00) (74 - 95)  RR: 27 (06 Dec 2022 06:00) (17 - 34)  SpO2: 97% (06 Dec 2022 06:00) (91% - 99%)    Parameters below as of 06 Dec 2022 04:00  Patient On (Oxygen Delivery Method): nasal cannula  O2 Flow (L/min): 3    Physical Exam:   General: NAD   Neurology: nonfocal   Eyes: bilateral pupils equal and reactive   ENT/Neck: Neck supple, trachea midline, No JVD   Respiratory: Clear bilaterally   CV: S1S2, no murmurs        [x] Sinus rhythm   Abdominal: Soft, NT, ND +BS  Extremities: negative pedal edema noted, + peripheral pulses   Skin: No Rashes, Hematoma, Ecchymosis                Assessment:  57-year-old male with past medical history hypertension, "arrhythmia" presents with bilateral chest pain radiating to the back over the past 2 days.  Patient reports severe bilateral back pain with radiation to the chest, reports occasional radiation to left lower extremity over the past 2 days.  Patient reports history of hypertension, states noncompliant with medications.  Denies any fevers, abdominal pain, vomiting, diarrhea, bloody stools, black tarry stools, or rash.  No recent injury or trauma.  Denies any additional complaints. CT scan completed and found to have a Type B aortic dissection . Pt with hypertension emergency.  started on Cardene gtt and esmolol gtt. after ct scan , pt was transferred to [place] for further medical and surgical management.       Transfer from [facility] Type B dissection  Hemodynamic instability    Plan:   ***Neuro***  [x] Nonfocal     ***Cardiovascular***  Invasive hemodynamic monitoring, assess perfusion indices   SR / MAP 95/ Hct 34.0%/ Lactate 0.5  Anti-impulse therapy with Labetalol PO, Procardia, and Losartan. Titrated for hemodynamic parameter goals   Procardia increased this morning 12/6  [x] VTE prophylaxis with Lovenox    ***Pulmonary***  [x] RA  Encourage incentive spirometry, continue pulse ox monitoring, follow ABGs             ***GI***  [x] Diet: CC  [x] Protonix    ***Renal***  [x] ALYSA  Continue to monitor I/Os, BUN/Creatinine.   Replete lytes PRN    ***ID***  No active antibiotic coverage.    ***Endocrine***  HbA1c 6.2%       - Continue monitoring blood glucose closely for need to initiate sliding scale         Patient requires continuous monitoring with bedside rhythm monitoring, pulse oximetry monitoring, and continuous central venous and arterial pressure monitoring; and intermittent blood gas analysis. Care plan discussed with the ICU care team.   Patient remained critical, at risk for life threatening decompensation.    I have spent 30 minutes providing critical care management to this patient.    By signing my name below, I, Terri Duran, attest that this documentation has been prepared under the direction and in the presence of Félix Alejandre NP.  Electronically signed: Ciera Landeros, 12-06-22 @ 06:51    I, Félix Alejandre, personally performed the services described in this documentation. all medical record entries made by the scribe were at my direction and in my presence. I have reviewed the chart and agree that the record reflects my personal performance and is accurate and complete  Electronically signed: Félix Alejandre NP.

## 2022-12-06 NOTE — PROGRESS NOTE ADULT - PROBLEM SELECTOR PLAN 1
Acute TYPE b dissection   aggressive BP control with cardene , labetaolol   Pain management   Vascular consult -/+ need for TEVAR stent    GI / DVT prophylaxis.   keep K>4, mag >2.0   NPO   serial Physical exam / to r/o end organ malperfusion . Acute TYPE b dissection   aggressive BP control: Losartan, Procardia, Labetolol  Pain management   Vascular consulted   GI / DVT prophylaxis.   keep K>4, mag >2.0

## 2022-12-06 NOTE — PROGRESS NOTE ADULT - ASSESSMENT
57 yr male hx of HTN, obesity with chest pain who transferred from [facility] for an Acute type B dissection     < from: CT Angio Chest Aorta w/wo IV Cont (12.01.22 @ 21:35) >  IMPRESSION:  Type B aortic dissection extending from just beyond the origin of the   left subclavian artery extending into the right common iliac and left   external iliac arteries.    Medical management - Dr. Ruiz-renal following patient for HTN management-   currently on Losartan 100 qd           57 yr male hx of HTN, obesity with chest pain who transferred from [facility] for an Acute type B dissection     < from: CT Angio Chest Aorta w/wo IV Cont (12.01.22 @ 21:35) >  IMPRESSION:  Type B aortic dissection extending from just beyond the origin of the   left subclavian artery extending into the right common iliac and left   external iliac arteries.    Medical management - Dr. Ruiz-renal following patient for HTN management-   currently on Losartan 100 qd  Procardia increased to 120mg po daily this am  labetolol 600mg po q6h  dvt prophylaxis    d/c plan home when bp well controlled ? tomorrow ? thursday

## 2022-12-06 NOTE — PROGRESS NOTE ADULT - SUBJECTIVE AND OBJECTIVE BOX
NEPHROLOGY-[facility] [phone]        Patient seen and examined in bed.  He was the same         MEDICATIONS  (STANDING):  chlorhexidine 2% Cloths 1 Application(s) Topical <User Schedule>  enoxaparin Injectable 40 milliGRAM(s) SubCutaneous every 24 hours  labetalol 600 milliGRAM(s) Oral every 6 hours  losartan 100 milliGRAM(s) Oral <User Schedule>  pantoprazole    Tablet 40 milliGRAM(s) Oral before breakfast      VITAL:  T(C): , Max: 37.7 (12-05-22 @ 20:00)  T(F): , Max: 99.8 (12-05-22 @ 20:00)  HR: 80 (12-06-22 @ 08:00)  BP: 141/70 (12-06-22 @ 08:00)  BP(mean): 95 (12-06-22 @ 08:00)  RR: 24 (12-06-22 @ 08:00)  SpO2: 96% (12-06-22 @ 08:00)  Wt(kg): --    I and O's:    12-05 @ 07:01  -  12-06 @ 07:00  --------------------------------------------------------  IN: 1230 mL / OUT: 1950 mL / NET: -720 mL    12-06 @ 07:01  -  12-06 @ 08:25  --------------------------------------------------------  IN: 120 mL / OUT: 0 mL / NET: 120 mL          PHYSICAL EXAM:    Constitutional: NAD  Neck:  No JVD  Respiratory: CTAB/L  Cardiovascular: S1 and S2  Gastrointestinal: BS+, soft, NT/ND  Extremities: No peripheral edema  Neurological: A/O x 3, no focal deficits  Psychiatric: Normal mood, normal affect  : No Diaz  Skin: No rashes  Access: Not applicable    LABS:                        10.8   6.91  )-----------( 162      ( 06 Dec 2022 01:20 )             34.0     12-06    137  |  102  |  20  ----------------------------<  109<H>  4.2   |  25  |  1.06    Ca    8.8      06 Dec 2022 01:20  Phos  3.9     12-06  Mg     2.0     12-06    TPro  6.5  /  Alb  3.5  /  TBili  0.8  /  DBili  x   /  AST  23  /  ALT  19  /  AlkPhos  82  12-06          Urine Studies:          RADIOLOGY & ADDITIONAL STUDIES:    < from: Xray Chest 1 View- PORTABLE-Routine (Xray Chest 1 View- PORTABLE-Routine in AM.) (12.05.22 @ 01:44) >    ACC: [id number] EXAM:  XR CHEST PORTABLE ROUTINE 1V                          PROCEDURE DATE:  12/05/2022          INTERPRETATION:  EXAMINATION: XR CHEST    CLINICAL INDICATION: Aortic dissection    TECHNIQUE: Single frontal, portable view of the chest was obtained.    COMPARISON: Chest x-ray 12/4/2022.    FINDINGS:  LINES/TUBES/SUPPORT DEVICES: None    LUNGS/PLEURA: The lungs are clear. No pleural effusion or pneumothorax.    HEART AND MEDIASTINUM: Heart size is poorly assessed on this projection.    BONES: No acute bony abnormality.    IMPRESSION:  Clear lungs.    --- End of Report ---          TRINI REYNOSO MD; Resident Radiologist  This document has been electronically signed.  LETI PURDY MD; Attending Radiologist  This document has been electronically signed. Dec  5 2022  9:20AM    < end of copied text >

## 2022-12-07 VITALS
TEMPERATURE: 98 F | DIASTOLIC BLOOD PRESSURE: 76 MMHG | SYSTOLIC BLOOD PRESSURE: 129 MMHG | OXYGEN SATURATION: 93 % | RESPIRATION RATE: 18 BRPM | HEART RATE: 79 BPM

## 2022-12-07 LAB
ALBUMIN SERPL ELPH-MCNC: 3.4 G/DL — SIGNIFICANT CHANGE UP (ref 3.3–5)
ALP SERPL-CCNC: 83 U/L — SIGNIFICANT CHANGE UP (ref 40–120)
ALT FLD-CCNC: 20 U/L — SIGNIFICANT CHANGE UP (ref 10–45)
ANION GAP SERPL CALC-SCNC: 11 MMOL/L — SIGNIFICANT CHANGE UP (ref 5–17)
AST SERPL-CCNC: 17 U/L — SIGNIFICANT CHANGE UP (ref 10–40)
BILIRUB SERPL-MCNC: 0.9 MG/DL — SIGNIFICANT CHANGE UP (ref 0.2–1.2)
BUN SERPL-MCNC: 16 MG/DL — SIGNIFICANT CHANGE UP (ref 7–23)
CALCIUM SERPL-MCNC: 8.5 MG/DL — SIGNIFICANT CHANGE UP (ref 8.4–10.5)
CHLORIDE SERPL-SCNC: 100 MMOL/L — SIGNIFICANT CHANGE UP (ref 96–108)
CO2 SERPL-SCNC: 24 MMOL/L — SIGNIFICANT CHANGE UP (ref 22–31)
CORT UR RND CORT/CREAT RATIO: 23 MCG/G CR — SIGNIFICANT CHANGE UP (ref 1–119)
CREAT SERPL-MCNC: 0.92 MG/DL — SIGNIFICANT CHANGE UP (ref 0.5–1.3)
CREAT UR-MCNC: 133 MG/DL — SIGNIFICANT CHANGE UP (ref 16–326)
EGFR: 98 ML/MIN/1.73M2 — SIGNIFICANT CHANGE UP
GLUCOSE SERPL-MCNC: 91 MG/DL — SIGNIFICANT CHANGE UP (ref 70–99)
HCT VFR BLD CALC: 35 % — LOW (ref 39–50)
HGB BLD-MCNC: 11 G/DL — LOW (ref 13–17)
MCHC RBC-ENTMCNC: 25.6 PG — LOW (ref 27–34)
MCHC RBC-ENTMCNC: 31.4 GM/DL — LOW (ref 32–36)
MCV RBC AUTO: 81.4 FL — SIGNIFICANT CHANGE UP (ref 80–100)
NRBC # BLD: 0 /100 WBCS — SIGNIFICANT CHANGE UP (ref 0–0)
PLATELET # BLD AUTO: 167 K/UL — SIGNIFICANT CHANGE UP (ref 150–400)
POTASSIUM SERPL-MCNC: 4.2 MMOL/L — SIGNIFICANT CHANGE UP (ref 3.5–5.3)
POTASSIUM SERPL-SCNC: 4.2 MMOL/L — SIGNIFICANT CHANGE UP (ref 3.5–5.3)
PROT SERPL-MCNC: 6.6 G/DL — SIGNIFICANT CHANGE UP (ref 6–8.3)
RBC # BLD: 4.3 M/UL — SIGNIFICANT CHANGE UP (ref 4.2–5.8)
RBC # FLD: 15.9 % — HIGH (ref 10.3–14.5)
SODIUM SERPL-SCNC: 135 MMOL/L — SIGNIFICANT CHANGE UP (ref 135–145)
WBC # BLD: 6.98 K/UL — SIGNIFICANT CHANGE UP (ref 3.8–10.5)
WBC # FLD AUTO: 6.98 K/UL — SIGNIFICANT CHANGE UP (ref 3.8–10.5)

## 2022-12-07 PROCEDURE — 80053 COMPREHEN METABOLIC PANEL: CPT

## 2022-12-07 PROCEDURE — 83605 ASSAY OF LACTIC ACID: CPT

## 2022-12-07 PROCEDURE — 81001 URINALYSIS AUTO W/SCOPE: CPT

## 2022-12-07 PROCEDURE — 85520 HEPARIN ASSAY: CPT

## 2022-12-07 PROCEDURE — 85018 HEMOGLOBIN: CPT

## 2022-12-07 PROCEDURE — 85027 COMPLETE CBC AUTOMATED: CPT

## 2022-12-07 PROCEDURE — 83036 HEMOGLOBIN GLYCOSYLATED A1C: CPT

## 2022-12-07 PROCEDURE — 82550 ASSAY OF CK (CPK): CPT

## 2022-12-07 PROCEDURE — 80048 BASIC METABOLIC PNL TOTAL CA: CPT

## 2022-12-07 PROCEDURE — 82570 ASSAY OF URINE CREATININE: CPT

## 2022-12-07 PROCEDURE — 84100 ASSAY OF PHOSPHORUS: CPT

## 2022-12-07 PROCEDURE — 84244 ASSAY OF RENIN: CPT

## 2022-12-07 PROCEDURE — 85384 FIBRINOGEN ACTIVITY: CPT

## 2022-12-07 PROCEDURE — 83880 ASSAY OF NATRIURETIC PEPTIDE: CPT

## 2022-12-07 PROCEDURE — 84132 ASSAY OF SERUM POTASSIUM: CPT

## 2022-12-07 PROCEDURE — 85610 PROTHROMBIN TIME: CPT

## 2022-12-07 PROCEDURE — 83690 ASSAY OF LIPASE: CPT

## 2022-12-07 PROCEDURE — 85025 COMPLETE CBC W/AUTO DIFF WBC: CPT

## 2022-12-07 PROCEDURE — 82530 CORTISOL FREE: CPT

## 2022-12-07 PROCEDURE — 86901 BLOOD TYPING SEROLOGIC RH(D): CPT

## 2022-12-07 PROCEDURE — 84484 ASSAY OF TROPONIN QUANT: CPT

## 2022-12-07 PROCEDURE — 82330 ASSAY OF CALCIUM: CPT

## 2022-12-07 PROCEDURE — 83735 ASSAY OF MAGNESIUM: CPT

## 2022-12-07 PROCEDURE — 82962 GLUCOSE BLOOD TEST: CPT

## 2022-12-07 PROCEDURE — 85014 HEMATOCRIT: CPT

## 2022-12-07 PROCEDURE — 82565 ASSAY OF CREATININE: CPT

## 2022-12-07 PROCEDURE — 82088 ASSAY OF ALDOSTERONE: CPT

## 2022-12-07 PROCEDURE — C8929: CPT

## 2022-12-07 PROCEDURE — 99238 HOSP IP/OBS DSCHRG MGMT 30/<: CPT

## 2022-12-07 PROCEDURE — 82533 TOTAL CORTISOL: CPT

## 2022-12-07 PROCEDURE — 93005 ELECTROCARDIOGRAM TRACING: CPT

## 2022-12-07 PROCEDURE — 82803 BLOOD GASES ANY COMBINATION: CPT

## 2022-12-07 PROCEDURE — 84443 ASSAY THYROID STIM HORMONE: CPT

## 2022-12-07 PROCEDURE — 84295 ASSAY OF SERUM SODIUM: CPT

## 2022-12-07 PROCEDURE — 86850 RBC ANTIBODY SCREEN: CPT

## 2022-12-07 PROCEDURE — 86900 BLOOD TYPING SEROLOGIC ABO: CPT

## 2022-12-07 PROCEDURE — 82947 ASSAY GLUCOSE BLOOD QUANT: CPT

## 2022-12-07 PROCEDURE — 83835 ASSAY OF METANEPHRINES: CPT

## 2022-12-07 PROCEDURE — 85730 THROMBOPLASTIN TIME PARTIAL: CPT

## 2022-12-07 PROCEDURE — 82553 CREATINE MB FRACTION: CPT

## 2022-12-07 PROCEDURE — 71045 X-RAY EXAM CHEST 1 VIEW: CPT

## 2022-12-07 PROCEDURE — 82435 ASSAY OF BLOOD CHLORIDE: CPT

## 2022-12-07 PROCEDURE — 36415 COLL VENOUS BLD VENIPUNCTURE: CPT

## 2022-12-07 PROCEDURE — 82150 ASSAY OF AMYLASE: CPT

## 2022-12-07 RX ORDER — METOPROLOL TARTRATE 50 MG
1 TABLET ORAL
Qty: 30 | Refills: 0
Start: 2022-12-07 | End: 2023-01-05

## 2022-12-07 RX ORDER — NIFEDIPINE 30 MG
2 TABLET, EXTENDED RELEASE 24 HR ORAL
Qty: 60 | Refills: 0
Start: 2022-12-07 | End: 2023-01-05

## 2022-12-07 RX ORDER — ACETAMINOPHEN 500 MG
2 TABLET ORAL
Qty: 0 | Refills: 0 | DISCHARGE
Start: 2022-12-07

## 2022-12-07 RX ORDER — VALSARTAN 80 MG/1
1 TABLET ORAL
Qty: 30 | Refills: 0
Start: 2022-12-07 | End: 2023-01-05

## 2022-12-07 RX ORDER — METOPROLOL TARTRATE 50 MG
200 TABLET ORAL DAILY
Refills: 0 | Status: DISCONTINUED | OUTPATIENT
Start: 2022-12-07 | End: 2022-12-07

## 2022-12-07 RX ADMIN — Medication 100 MILLIGRAM(S): at 05:50

## 2022-12-07 RX ADMIN — PANTOPRAZOLE SODIUM 40 MILLIGRAM(S): 20 TABLET, DELAYED RELEASE ORAL at 05:50

## 2022-12-07 RX ADMIN — SPIRONOLACTONE 25 MILLIGRAM(S): 25 TABLET, FILM COATED ORAL at 05:50

## 2022-12-07 RX ADMIN — ENOXAPARIN SODIUM 40 MILLIGRAM(S): 100 INJECTION SUBCUTANEOUS at 05:51

## 2022-12-07 RX ADMIN — Medication 120 MILLIGRAM(S): at 05:50

## 2022-12-07 RX ADMIN — VALSARTAN 320 MILLIGRAM(S): 80 TABLET ORAL at 05:49

## 2022-12-07 RX ADMIN — CHLORHEXIDINE GLUCONATE 1 APPLICATION(S): 213 SOLUTION TOPICAL at 08:42

## 2022-12-07 NOTE — PROGRESS NOTE ADULT - SUBJECTIVE AND OBJECTIVE BOX
NEPHROLOGY-[facility] [phone]        Patient seen and examined in bed.  He was the same         MEDICATIONS  (STANDING):  chlorhexidine 2% Cloths 1 Application(s) Topical <User Schedule>  enoxaparin Injectable 40 milliGRAM(s) SubCutaneous every 24 hours  metoprolol succinate  milliGRAM(s) Oral two times a day  NIFEdipine  milliGRAM(s) Oral daily  pantoprazole    Tablet 40 milliGRAM(s) Oral before breakfast  spironolactone 25 milliGRAM(s) Oral daily  valsartan 320 milliGRAM(s) Oral daily      VITAL:  T(C): , Max: 37.6 (12-06-22 @ 20:09)  T(F): , Max: 99.7 (12-06-22 @ 20:09)  HR: 82 (12-07-22 @ 04:04)  BP: 137/72 (12-07-22 @ 04:04)  BP(mean): 96 (12-06-22 @ 17:34)  RR: 18 (12-07-22 @ 04:04)  SpO2: 93% (12-07-22 @ 04:04)  Wt(kg): --    I and O's:    12-06 @ 07:01  -  12-07 @ 07:00  --------------------------------------------------------  IN: 170 mL / OUT: 2175 mL / NET: -2005 mL          PHYSICAL EXAM:    Constitutional: NAD  Neck:  No JVD  Respiratory: CTAB/L  Cardiovascular: S1 and S2  Gastrointestinal: BS+, soft, NT/ND  Extremities: No peripheral edema  Neurological: A/O x 3, no focal deficits  Psychiatric: Normal mood, normal affect  : No Diaz  Skin: No rashes  Access: Not applicable    LABS:                        11.0   6.98  )-----------( 167      ( 07 Dec 2022 06:09 )             35.0     12-07    135  |  100  |  16  ----------------------------<  91  4.2   |  24  |  0.92    Ca    8.5      07 Dec 2022 06:09  Phos  3.9     12-06  Mg     2.0     12-06    TPro  6.6  /  Alb  3.4  /  TBili  0.9  /  DBili  x   /  AST  17  /  ALT  20  /  AlkPhos  83  12-07          Urine Studies:          RADIOLOGY & ADDITIONAL STUDIES:

## 2022-12-07 NOTE — DISCHARGE NOTE PROVIDER - NSDCCPCAREPLAN_GEN_ALL_CORE_FT
PRINCIPAL DISCHARGE DIAGNOSIS  Diagnosis: Dissection of aorta  Assessment and Plan of Treatment: Type B Aortic dissection- medically management  shower daily  weigh yourself daily  continue current prescriptions as ordered  increase activity as tolerated   no added salt; low fat; low cholesterol, low salt diet.   follow up with Cardiologist in 1-2 weeks. Call to schedule appointment.  follow up with cardiac surgeon, Dr. Cooper with Aortic registry in 6 months; Dr. Cooper's office to call and schedule follow-up ct scan  follow up with htn specialist, Dr. Ruiz, in 1 week; call office to schedule appointment.

## 2022-12-07 NOTE — DISCHARGE NOTE PROVIDER - CARE PROVIDER_API CALL
Richy Cooper)  Thoracic and Cardiac Surgery  [address] [place] [address]  Phone: [phone]  Fax: [phone]  Follow Up Time:     Tl Ruiz)  Internal Medicine; Nephrology  [address] [facility], [address]  [place] [address]  Phone: [phone]  Fax: [phone]  Follow Up Time:

## 2022-12-07 NOTE — PROGRESS NOTE ADULT - REASON FOR ADMISSION
Chest pain
type b dissection
Chest pain

## 2022-12-07 NOTE — PROGRESS NOTE ADULT - PROVIDER SPECIALTY LIST ADULT
Critical Care
Nephrology
CT Surgery
Nephrology
CT Surgery

## 2022-12-07 NOTE — DISCHARGE NOTE PROVIDER - HOSPITAL COURSE
57 yr male hx of HTN, obesity with chest pain who transferred from [facility] for an Acute type B dissection     < from: CT Angio Chest Aorta w/wo IV Cont (12.01.22 @ 21:35) >  IMPRESSION:  Type B aortic dissection extending from just beyond the origin of the   left subclavian artery extending into the right common iliac and left   external iliac arteries.    Medical management - Dr. Ruiz-renal following patient for HTN management-   currently on Losartan 100 qd  Procardia increased to 120mg po daily this am  labetolol 600mg po q6h  dvt prophylaxis    12/7 VSS; RSR 70's; bp controlled; change bb to toprol 200 qd as per Dr. Cooper; discharge pt home today and f/u with aortic registry in 6 months - ct surgery office to call for f/u  f/u Dr. Ruiz for HTN management

## 2022-12-07 NOTE — DISCHARGE NOTE PROVIDER - NSDCMRMEDTOKEN_GEN_ALL_CORE_FT
acetaminophen 325 mg oral tablet: 2 tab(s) orally every 6 hours, As needed, Moderate Pain (4 - 6)  metoprolol succinate 200 mg oral tablet, extended release: 1 tab(s) orally once a day  NIFEdipine 60 mg oral tablet, extended release: 2 tab(s) (total 120 mg) orally once a day  valsartan 320 mg oral tablet: 1 tab(s) orally once a day

## 2022-12-07 NOTE — DISCHARGE NOTE PROVIDER - CARE PROVIDERS DIRECT ADDRESSES
[email or web address]

## 2022-12-07 NOTE — DISCHARGE NOTE NURSING/CASE MANAGEMENT/SOCIAL WORK - NSDCPEFALRISK_GEN_ALL_CORE
For information on Fall & Injury Prevention, visit: [email or web address] OR  [email or web address] OR  https://www.cdc.gov/steadi/patient.html

## 2022-12-07 NOTE — DISCHARGE NOTE NURSING/CASE MANAGEMENT/SOCIAL WORK - PATIENT PORTAL LINK FT
You can access the FollowMyHealth Patient Portal offered by [facility] by registering at the following website: [email or web address]. By joining [employer]’s FollowMyHealth portal, you will also be able to view your health information using other applications (apps) compatible with our system.

## 2022-12-07 NOTE — PROGRESS NOTE ADULT - ASSESSMENT
57-year-old male with past medical history hypertension pw acute type B dissection  left kidney through the true lumen but there is symmetrical enhancements of both kidneys   Glucose intolerance   ALYSA - Could be a combination of contrast and BP control     1 [facility]-Echo confirmed LVH ensuing chronicity to the HTN (likely present > 10 yrs)   Check Renin/Aldosterone/catecholamines and urine metanephrines- All testing at present   May decide on renal dopplers but he has a strong family hx of HTN   2 Renal-  Aldactone 25mg po qd may be a good long term option    Valsartan 320 mg this am(he was on cozaar yesterday)   Nifedipine increased already today   Can trend the BP today..If the BP is high then dc toprol and switch to coreg 25mg po bid   He will have feeling of fatigue for 2-3 weeks with aggressive BP control   3 Misc-Wt loss and trend HGA1C     [employer]   [phone]   [employer]   [phone]

## 2022-12-07 NOTE — DISCHARGE NOTE PROVIDER - NSDCPNSUBOBJ_GEN_ALL_CORE
VSS  neuro: alert and oriented-3; no focal neuro deficits noted  tele: rsr 70's  audible S1 S2  lungs clear; RR easy unlabored  + bs nt nd + bm; neg n/v/d; obese abdomen   extrem: STARKS-4; neg edema, neg calf tenderness, ppp bilaterally    Discharge pt home today 12/7 as per Dr. mendosa

## 2022-12-11 LAB — RENIN PLAS-CCNC: 0.64 NG/ML/HR — SIGNIFICANT CHANGE UP (ref 0.17–5.38)

## 2022-12-12 PROBLEM — Z00.00 ENCOUNTER FOR PREVENTIVE HEALTH EXAMINATION: Status: ACTIVE | Noted: 2022-12-12

## 2022-12-13 LAB
CREATININE, RNDM UR: 129 MG/DL — SIGNIFICANT CHANGE UP
METANEPHS 24H UR-MCNC: 0.5 — SIGNIFICANT CHANGE UP (ref 0–1)
METANEPHS 24H UR-MCNC: 117 UG/L — SIGNIFICANT CHANGE UP
NORMETANEPHRINE.: 420 UG/L — SIGNIFICANT CHANGE UP

## 2022-12-16 LAB
CORTICOSTEROID BINDING GLOBULIN RESULT: 1.1 MG/DL — LOW
CORTIS F/TOTAL MFR SERPL: 45 % — SIGNIFICANT CHANGE UP
CORTIS SERPL-MCNC: 13 UG/DL — SIGNIFICANT CHANGE UP
CORTISOL, FREE RESULT: 5.9 UG/DL — HIGH

## 2022-12-22 ENCOUNTER — NON-APPOINTMENT (OUTPATIENT)
Age: 57
End: 2022-12-22

## 2024-03-26 NOTE — ED PROVIDER NOTE - IV ALTEPLASE EXCL ABS HIDDEN
Requested medication: Gabapentin  Last office visit/physical:  11/13/23  Last follow up/disposition: 1 month  Appointment scheduled (FP)?   No   Last refill:  2/22/24 show

## 2024-08-24 NOTE — CONSULT NOTE ADULT - REASON FOR ADMISSION
"Addendum entered and electronically signed by Kaushik Acevedo MD  08/24/24 09:20: "~"I saw and examined the patient."~"The PA's note was reviewed and I agree with the note."~"Comment:   "~"Preop for CABG, second case Monday with Dr. Gunter"~"Original Note:"~"Today's Communication / Plan"~"-"~"-: "~"Stable overnight.  No chest pain."~"Plavix washout. Continue IV heparin."~"Plan AVR/CABG/MAZE/LAAL on Monday, second case, Dr. Gunter."~"E. coli UTI being treated with Augmentin"~"Assessment / Plan"~"-"~"-: "~"-Unstable angina at Cardiac rehab @ [facility] on Friday 8/16/24- no relief with sl Nitro, improved with Morphine"~"	-Left heart catheterization on Monday 8/18/24 revealed critical and extensively calcified mid left anterior descending as culprit vessel."~"	-Echocardiogram revealed a moderate AS and moderately elevated pulmonary artery pressure, EF 65-70%"~"	-on Plavix for recent stent, last dose on Monday 8/19/24"~"	-transferred to  on 8/20 for evaluation for CABG and possibly AVR"~"-Hx MI/CAD S/P PCI with VANESA to LAD in June 2024"~"-[place]"~"-T2DM since age of 21, on insulin with hgb A1C of  7.2         "~"-Diabetic neuropathy       "~"-PAF S/P ablation, 2021"~"-PSVT S/P ablation, 2021"~"-HTN"~"-HLD"~"-Class 2 obesity (BMI 37.6)"~"-S/P b/l cataracts"~"-S/p Repair of retinal detachment, 2021"~"-S/P b/l carpal tunnel "~"-S/p Cholecystectomy "~"-S/P Appendectomy"~"-S/P Left TKA "~"-S/p Release of trigger finger "~"-Gastric bypass noted on CT"~"Subjective"~"-"~"-: "~"Date of Service:  August 24, 2024"~"Feels okay.  No chest pain overnight.  Sleeping soundly."~"Objective Data"~"-"~"-: "~"Lab Results"~"08/24/24 03:47 "~"08/21/24 05:47 "~"PT	 13.6 Sec (11.4-14.6)  	08/21/24  05:47    "~"INR	 1.06  	08/21/24  05:47    "~"APTT	 91.2 Sec (23.4-35.0)  H 	08/24/24  03:47    "~"Vital Signs"~"-: "~"Vital Signs"~"Temp	Pulse	Resp	BP	Pulse Ox"~" 97.7 F 	 57 	 18 	 147/65 	 99 "~" 08/24/24 03:39	 08/24/24 05:00	 08/24/24 03:39	 08/24/24 03:39	 08/24/24 03:39"~"CT Intake/Output/Weight"~"	08/23/24	08/23/24	08/24/24"~"	06:59	18:59	06:59"~"Intake Total		120 / 120	"~"Balance		120 / 120	"~"SaO2: 99"~"Physical Exam"~"-"~"General: Awake and Oriented"~"Cardiovascular: Regular rate & rhythm"~"Respiratory: Clear and Equal"~"Extremities: No Edema"~"Data Reviewed"~"-"~"Lab Results: Results Reviewed"~"Medications: Active Meds Reviewed" Chest pain